# Patient Record
Sex: FEMALE | Race: BLACK OR AFRICAN AMERICAN | NOT HISPANIC OR LATINO | Employment: FULL TIME | URBAN - METROPOLITAN AREA
[De-identification: names, ages, dates, MRNs, and addresses within clinical notes are randomized per-mention and may not be internally consistent; named-entity substitution may affect disease eponyms.]

---

## 2023-04-08 LAB
ALANINE AMINOTRANSFERASE (SGPT) (U/L) IN SER/PLAS: 10 U/L (ref 7–45)
ALBUMIN (G/DL) IN SER/PLAS: 4.1 G/DL (ref 3.4–5)
ALKALINE PHOSPHATASE (U/L) IN SER/PLAS: 61 U/L (ref 33–110)
ANION GAP IN SER/PLAS: 13 MMOL/L (ref 10–20)
ASPARTATE AMINOTRANSFERASE (SGOT) (U/L) IN SER/PLAS: 16 U/L (ref 9–39)
BASOPHILS (10*3/UL) IN BLOOD BY AUTOMATED COUNT: 0.02 X10E9/L (ref 0–0.1)
BASOPHILS/100 LEUKOCYTES IN BLOOD BY AUTOMATED COUNT: 0.4 % (ref 0–2)
BILIRUBIN TOTAL (MG/DL) IN SER/PLAS: 0.4 MG/DL (ref 0–1.2)
C REACTIVE PROTEIN (MG/L) IN SER/PLAS: 0.58 MG/DL
CALCIUM (MG/DL) IN SER/PLAS: 9.6 MG/DL (ref 8.6–10.6)
CARBON DIOXIDE, TOTAL (MMOL/L) IN SER/PLAS: 24 MMOL/L (ref 21–32)
CHLORIDE (MMOL/L) IN SER/PLAS: 108 MMOL/L (ref 98–107)
CREATININE (MG/DL) IN SER/PLAS: 1.16 MG/DL (ref 0.5–1.05)
EOSINOPHILS (10*3/UL) IN BLOOD BY AUTOMATED COUNT: 0.08 X10E9/L (ref 0–0.7)
EOSINOPHILS/100 LEUKOCYTES IN BLOOD BY AUTOMATED COUNT: 1.5 % (ref 0–6)
ERYTHROCYTE DISTRIBUTION WIDTH (RATIO) BY AUTOMATED COUNT: 20.2 % (ref 11.5–14.5)
ERYTHROCYTE MEAN CORPUSCULAR HEMOGLOBIN CONCENTRATION (G/DL) BY AUTOMATED: 31 G/DL (ref 32–36)
ERYTHROCYTE MEAN CORPUSCULAR VOLUME (FL) BY AUTOMATED COUNT: 76 FL (ref 80–100)
ERYTHROCYTES (10*6/UL) IN BLOOD BY AUTOMATED COUNT: 4.38 X10E12/L (ref 4–5.2)
GFR FEMALE: 58 ML/MIN/1.73M2
GLUCOSE (MG/DL) IN SER/PLAS: 120 MG/DL (ref 74–99)
HEMATOCRIT (%) IN BLOOD BY AUTOMATED COUNT: 33.2 % (ref 36–46)
HEMOGLOBIN (G/DL) IN BLOOD: 10.3 G/DL (ref 12–16)
HYPOCHROMIA (PRESENCE) IN BLOOD BY LIGHT MICROSCOPY: NORMAL
IMMATURE GRANULOCYTES/100 LEUKOCYTES IN BLOOD BY AUTOMATED COUNT: 0.2 % (ref 0–0.9)
LEUKOCYTES (10*3/UL) IN BLOOD BY AUTOMATED COUNT: 5.2 X10E9/L (ref 4.4–11.3)
LYMPHOCYTES (10*3/UL) IN BLOOD BY AUTOMATED COUNT: 2.22 X10E9/L (ref 1.2–4.8)
LYMPHOCYTES/100 LEUKOCYTES IN BLOOD BY AUTOMATED COUNT: 42.5 % (ref 13–44)
MONOCYTES (10*3/UL) IN BLOOD BY AUTOMATED COUNT: 0.9 X10E9/L (ref 0.1–1)
MONOCYTES/100 LEUKOCYTES IN BLOOD BY AUTOMATED COUNT: 17.2 % (ref 2–10)
NEUTROPHILS (10*3/UL) IN BLOOD BY AUTOMATED COUNT: 1.99 X10E9/L (ref 1.2–7.7)
NEUTROPHILS/100 LEUKOCYTES IN BLOOD BY AUTOMATED COUNT: 38.2 % (ref 40–80)
NRBC (PER 100 WBCS) BY AUTOMATED COUNT: 0 /100 WBC (ref 0–0)
PLATELETS (10*3/UL) IN BLOOD AUTOMATED COUNT: 147 X10E9/L (ref 150–450)
POTASSIUM (MMOL/L) IN SER/PLAS: 4.9 MMOL/L (ref 3.5–5.3)
PROTEIN TOTAL: 7.3 G/DL (ref 6.4–8.2)
RBC MORPHOLOGY IN BLOOD: NORMAL
SCHISTOCYTES (PRESENCE) IN BLOOD BY LIGHT MICROSCOPY: NORMAL
SODIUM (MMOL/L) IN SER/PLAS: 140 MMOL/L (ref 136–145)
UREA NITROGEN (MG/DL) IN SER/PLAS: 11 MG/DL (ref 6–23)

## 2023-07-15 LAB
CHLAMYDIA TRACH., AMPLIFIED: NEGATIVE
N. GONORRHEA, AMPLIFIED: NEGATIVE
TRICHOMONAS VAGINALIS: NEGATIVE

## 2023-07-16 LAB
CLUE CELLS: NORMAL
NUGENT SCORE: 0
YEAST: NORMAL

## 2023-09-01 PROBLEM — D75.A G6PD DEFICIENCY: Status: ACTIVE | Noted: 2023-09-01

## 2023-09-01 PROBLEM — N89.8 VAGINAL DISCHARGE: Status: ACTIVE | Noted: 2023-09-01

## 2023-09-01 PROBLEM — G43.909 MIGRAINE HEADACHE: Status: ACTIVE | Noted: 2023-09-01

## 2023-09-01 PROBLEM — J45.909 ASTHMA (HHS-HCC): Status: ACTIVE | Noted: 2023-09-01

## 2023-09-01 PROBLEM — N76.0 BACTERIAL VAGINOSIS: Status: ACTIVE | Noted: 2023-09-01

## 2023-09-01 PROBLEM — I82.4Z2 ACUTE DEEP VEIN THROMBOSIS (DVT) OF DISTAL VEIN OF LEFT LOWER EXTREMITY (MULTI): Status: ACTIVE | Noted: 2023-09-01

## 2023-09-01 PROBLEM — T83.84XA: Status: ACTIVE | Noted: 2023-09-01

## 2023-09-01 PROBLEM — T83.32XA IUD STRINGS LOST: Status: ACTIVE | Noted: 2023-09-01

## 2023-09-01 PROBLEM — I82.5Z1 CHRONIC DEEP VEIN THROMBOSIS (DVT) OF DISTAL VEIN OF RIGHT LOWER EXTREMITY (MULTI): Status: ACTIVE | Noted: 2023-09-01

## 2023-09-01 PROBLEM — N89.8 VAGINAL IRRITATION: Status: ACTIVE | Noted: 2023-09-01

## 2023-09-01 PROBLEM — D50.9 MICROCYTIC ANEMIA: Status: ACTIVE | Noted: 2023-09-01

## 2023-09-01 PROBLEM — I10 HYPERTENSION: Status: ACTIVE | Noted: 2023-09-01

## 2023-09-01 PROBLEM — N76.0 VAGINAL INFECTION: Status: ACTIVE | Noted: 2023-09-01

## 2023-09-01 PROBLEM — Z79.899 LONG TERM USE OF DRUG: Status: ACTIVE | Noted: 2023-09-01

## 2023-09-01 PROBLEM — Z97.5 IUD CONTRACEPTION: Status: ACTIVE | Noted: 2023-09-01

## 2023-09-01 PROBLEM — B37.31 VAGINAL YEAST INFECTION: Status: ACTIVE | Noted: 2023-09-01

## 2023-09-01 PROBLEM — M05.9 RHEUMATOID ARTHRITIS WITH RHEUMATOID FACTOR (MULTI): Status: ACTIVE | Noted: 2023-09-01

## 2023-09-01 PROBLEM — M17.12 LEFT KNEE DJD: Status: ACTIVE | Noted: 2023-09-01

## 2023-09-01 PROBLEM — N76.2 VULVITIS: Status: ACTIVE | Noted: 2023-09-01

## 2023-09-01 PROBLEM — M35.00 SJOGREN'S SYNDROME (MULTI): Status: ACTIVE | Noted: 2023-09-01

## 2023-09-01 PROBLEM — M25.50 ARTHRALGIA: Status: ACTIVE | Noted: 2023-09-01

## 2023-09-01 PROBLEM — N93.9 ABNORMAL UTERINE BLEEDING: Status: ACTIVE | Noted: 2023-09-01

## 2023-09-01 PROBLEM — M54.2 CERVICALGIA: Status: ACTIVE | Noted: 2023-09-01

## 2023-09-01 PROBLEM — R55 SYNCOPE: Status: ACTIVE | Noted: 2023-09-01

## 2023-09-01 PROBLEM — B96.89 BACTERIAL VAGINOSIS: Status: ACTIVE | Noted: 2023-09-01

## 2023-09-01 RX ORDER — ZOLPIDEM TARTRATE 10 MG/1
1 TABLET ORAL NIGHTLY PRN
COMMUNITY
Start: 2020-11-09

## 2023-09-01 RX ORDER — POLYETHYLENE GLYCOL 3350, SODIUM CHLORIDE, SODIUM BICARBONATE, POTASSIUM CHLORIDE 420; 11.2; 5.72; 1.48 G/4L; G/4L; G/4L; G/4L
POWDER, FOR SOLUTION ORAL ONCE
COMMUNITY
End: 2023-10-09

## 2023-09-01 RX ORDER — WARFARIN 6 MG/1
1 TABLET ORAL DAILY
COMMUNITY

## 2023-09-01 RX ORDER — FLUTICASONE PROPIONATE 110 UG/1
1 AEROSOL, METERED RESPIRATORY (INHALATION) 2 TIMES DAILY
COMMUNITY
Start: 2020-11-09 | End: 2023-10-09

## 2023-09-01 RX ORDER — DIVALPROEX SODIUM 250 MG/1
2 TABLET, DELAYED RELEASE ORAL DAILY
COMMUNITY
Start: 2020-09-08

## 2023-09-01 RX ORDER — HYDRALAZINE HYDROCHLORIDE 100 MG/1
1 TABLET, FILM COATED ORAL 2 TIMES DAILY
COMMUNITY
Start: 2020-01-09

## 2023-09-01 RX ORDER — WARFARIN SODIUM 5 MG/1
1 TABLET ORAL DAILY
COMMUNITY
Start: 2020-08-25 | End: 2023-10-09

## 2023-09-01 RX ORDER — SIMVASTATIN 40 MG/1
1 TABLET, FILM COATED ORAL NIGHTLY
COMMUNITY
End: 2023-10-09

## 2023-09-01 RX ORDER — CLOTRIMAZOLE AND BETAMETHASONE DIPROPIONATE 10; .64 MG/G; MG/G
1 CREAM TOPICAL 3 TIMES DAILY
COMMUNITY
Start: 2018-07-26 | End: 2023-10-09

## 2023-09-01 RX ORDER — ATORVASTATIN CALCIUM 20 MG/1
0.5 TABLET, FILM COATED ORAL DAILY
COMMUNITY
End: 2023-10-09

## 2023-09-01 RX ORDER — ALBUTEROL SULFATE 90 UG/1
AEROSOL, METERED RESPIRATORY (INHALATION)
COMMUNITY
Start: 2015-04-22

## 2023-09-01 RX ORDER — FLUCONAZOLE 150 MG/1
1 TABLET ORAL
COMMUNITY
Start: 2016-12-12 | End: 2023-11-03

## 2023-09-01 RX ORDER — WARFARIN SODIUM 1 MG/1
1 TABLET ORAL DAILY
COMMUNITY
Start: 2020-11-02 | End: 2023-10-09

## 2023-09-01 RX ORDER — METFORMIN HYDROCHLORIDE 500 MG/1
1 TABLET ORAL 2 TIMES DAILY
COMMUNITY
Start: 2018-02-28

## 2023-09-01 RX ORDER — LABETALOL 200 MG/1
2 TABLET, FILM COATED ORAL 2 TIMES DAILY
COMMUNITY
Start: 2021-10-27

## 2023-09-01 RX ORDER — ZOLPIDEM TARTRATE 5 MG/1
1 TABLET ORAL NIGHTLY PRN
COMMUNITY
End: 2023-10-09

## 2023-09-01 RX ORDER — METOPROLOL SUCCINATE 200 MG/1
1 TABLET, EXTENDED RELEASE ORAL DAILY
COMMUNITY

## 2023-09-01 RX ORDER — AMLODIPINE BESYLATE 5 MG/1
1 TABLET ORAL DAILY
COMMUNITY
Start: 2021-10-27 | End: 2023-11-22

## 2023-09-01 RX ORDER — LEFLUNOMIDE 20 MG/1
1 TABLET ORAL DAILY
COMMUNITY
Start: 2014-09-03 | End: 2024-03-27 | Stop reason: SDUPTHER

## 2023-09-01 RX ORDER — METAXALONE 800 MG/1
1 TABLET ORAL 3 TIMES DAILY
COMMUNITY
Start: 2019-07-12 | End: 2023-10-09

## 2023-09-01 RX ORDER — ETONOGESTREL 68 MG/1
IMPLANT SUBCUTANEOUS
COMMUNITY

## 2023-09-01 RX ORDER — ACETAMINOPHEN 500 MG
1 TABLET ORAL DAILY
COMMUNITY
Start: 2015-08-21 | End: 2023-10-09

## 2023-09-01 RX ORDER — GABAPENTIN 300 MG/1
1 CAPSULE ORAL NIGHTLY
COMMUNITY
Start: 2014-08-27 | End: 2024-03-27 | Stop reason: SDUPTHER

## 2023-09-01 RX ORDER — BLOOD SUGAR DIAGNOSTIC
STRIP MISCELLANEOUS
COMMUNITY
Start: 2021-10-24

## 2023-09-15 LAB
INR IN PPP BY COAGULATION ASSAY EXTERNAL: 2.5
PROTHROMBIN TIME (PT) IN PPP BY COAGULATION ASSAY EXTERNAL: NORMAL SECONDS

## 2023-10-06 ENCOUNTER — LAB (OUTPATIENT)
Dept: LAB | Facility: LAB | Age: 48
End: 2023-10-06
Payer: COMMERCIAL

## 2023-10-06 ENCOUNTER — ANTICOAGULATION - WARFARIN VISIT (OUTPATIENT)
Dept: CARDIOLOGY | Facility: CLINIC | Age: 48
End: 2023-10-06
Payer: COMMERCIAL

## 2023-10-06 ENCOUNTER — TELEPHONE (OUTPATIENT)
Dept: RHEUMATOLOGY | Facility: CLINIC | Age: 48
End: 2023-10-06

## 2023-10-06 DIAGNOSIS — M05.9 RHEUMATOID ARTHRITIS WITH RHEUMATOID FACTOR, UNSPECIFIED (MULTI): ICD-10-CM

## 2023-10-06 DIAGNOSIS — M05.9 RHEUMATOID ARTHRITIS WITH RHEUMATOID FACTOR, UNSPECIFIED (MULTI): Primary | ICD-10-CM

## 2023-10-06 DIAGNOSIS — M25.562 ARTHRALGIA OF LEFT KNEE: Primary | ICD-10-CM

## 2023-10-06 LAB
ACANTHOCYTES BLD QL SMEAR: NORMAL
ALBUMIN SERPL BCP-MCNC: 4.6 G/DL (ref 3.4–5)
ALP SERPL-CCNC: 61 U/L (ref 33–110)
ALT SERPL W P-5'-P-CCNC: 15 U/L (ref 7–45)
ANION GAP SERPL CALC-SCNC: 15 MMOL/L (ref 10–20)
AST SERPL W P-5'-P-CCNC: 19 U/L (ref 9–39)
BASOPHILS # BLD MANUAL: 0.08 X10*3/UL (ref 0–0.1)
BASOPHILS NFR BLD MANUAL: 1.7 %
BILIRUB SERPL-MCNC: 0.5 MG/DL (ref 0–1.2)
BUN SERPL-MCNC: 12 MG/DL (ref 6–23)
CALCIUM SERPL-MCNC: 9.6 MG/DL (ref 8.6–10.6)
CHLORIDE SERPL-SCNC: 108 MMOL/L (ref 98–107)
CO2 SERPL-SCNC: 23 MMOL/L (ref 21–32)
CREAT SERPL-MCNC: 1.17 MG/DL (ref 0.5–1.05)
CRP SERPL-MCNC: 0.13 MG/DL
EOSINOPHIL # BLD MANUAL: 0.21 X10*3/UL (ref 0–0.7)
EOSINOPHIL NFR BLD MANUAL: 4.4 %
ERYTHROCYTE [DISTWIDTH] IN BLOOD BY AUTOMATED COUNT: 21.7 % (ref 11.5–14.5)
GFR SERPL CREATININE-BSD FRML MDRD: 57 ML/MIN/1.73M*2
GLUCOSE SERPL-MCNC: 88 MG/DL (ref 74–99)
HCT VFR BLD AUTO: 34.4 % (ref 36–46)
HGB BLD-MCNC: 10.5 G/DL (ref 12–16)
HYPOCHROMIA BLD QL SMEAR: NORMAL
IMM GRANULOCYTES # BLD AUTO: 0.01 X10*3/UL (ref 0–0.7)
IMM GRANULOCYTES NFR BLD AUTO: 0.2 % (ref 0–0.9)
LYMPHOCYTES # BLD MANUAL: 2.43 X10*3/UL (ref 1.2–4.8)
LYMPHOCYTES NFR BLD MANUAL: 51.8 %
MCH RBC QN AUTO: 23 PG (ref 26–34)
MCHC RBC AUTO-ENTMCNC: 30.5 G/DL (ref 32–36)
MCV RBC AUTO: 75 FL (ref 80–100)
MONOCYTES # BLD MANUAL: 0.58 X10*3/UL (ref 0.1–1)
MONOCYTES NFR BLD MANUAL: 12.3 %
NEUTS SEG # BLD MANUAL: 1.4 X10*3/UL (ref 1.2–7)
NEUTS SEG NFR BLD MANUAL: 29.8 %
NRBC BLD-RTO: 0 /100 WBCS (ref 0–0)
OVALOCYTES BLD QL SMEAR: NORMAL
PLATELET # BLD AUTO: 135 X10*3/UL (ref 150–450)
PMV BLD AUTO: ABNORMAL FL
POC INR: 3.4
POC PROTHROMBIN TIME: NORMAL
POLYCHROMASIA BLD QL SMEAR: NORMAL
POTASSIUM SERPL-SCNC: 4.6 MMOL/L (ref 3.5–5.3)
PROT SERPL-MCNC: 7.8 G/DL (ref 6.4–8.2)
RBC # BLD AUTO: 4.57 X10*6/UL (ref 4–5.2)
RBC MORPH BLD: NORMAL
SCHISTOCYTES BLD QL SMEAR: NORMAL
SODIUM SERPL-SCNC: 141 MMOL/L (ref 136–145)
TOTAL CELLS COUNTED BLD: 114
WBC # BLD AUTO: 4.7 X10*3/UL (ref 4.4–11.3)

## 2023-10-06 PROCEDURE — 85610 PROTHROMBIN TIME: CPT

## 2023-10-06 PROCEDURE — 36415 COLL VENOUS BLD VENIPUNCTURE: CPT

## 2023-10-06 PROCEDURE — 85060 BLOOD SMEAR INTERPRETATION: CPT | Performed by: INTERNAL MEDICINE

## 2023-10-06 PROCEDURE — 85610 PROTHROMBIN TIME: CPT | Mod: QW | Performed by: INTERNAL MEDICINE

## 2023-10-06 PROCEDURE — 86140 C-REACTIVE PROTEIN: CPT

## 2023-10-06 PROCEDURE — 99211 OFF/OP EST MAY X REQ PHY/QHP: CPT | Mod: 25 | Performed by: INTERNAL MEDICINE

## 2023-10-06 PROCEDURE — 85027 COMPLETE CBC AUTOMATED: CPT

## 2023-10-06 PROCEDURE — 80053 COMPREHEN METABOLIC PANEL: CPT

## 2023-10-06 PROCEDURE — 85007 BL SMEAR W/DIFF WBC COUNT: CPT

## 2023-10-06 NOTE — TELEPHONE ENCOUNTER
Patient said Dr. Pepe prescribed a knee x-ray and regular blood work for testing. She went to the lab and the radiology department and neither has record of the orders being placed. Please advise.

## 2023-10-07 ENCOUNTER — APPOINTMENT (OUTPATIENT)
Dept: CARDIOLOGY | Facility: CLINIC | Age: 48
End: 2023-10-07
Payer: COMMERCIAL

## 2023-10-09 ENCOUNTER — ANCILLARY PROCEDURE (OUTPATIENT)
Dept: RADIOLOGY | Facility: CLINIC | Age: 48
End: 2023-10-09
Payer: COMMERCIAL

## 2023-10-09 ENCOUNTER — OFFICE VISIT (OUTPATIENT)
Dept: RHEUMATOLOGY | Facility: CLINIC | Age: 48
End: 2023-10-09
Payer: COMMERCIAL

## 2023-10-09 ENCOUNTER — HOSPITAL ENCOUNTER (OUTPATIENT)
Dept: RADIOLOGY | Facility: HOSPITAL | Age: 48
Discharge: HOME | End: 2023-10-09
Payer: COMMERCIAL

## 2023-10-09 VITALS
SYSTOLIC BLOOD PRESSURE: 130 MMHG | TEMPERATURE: 97.6 F | BODY MASS INDEX: 30.21 KG/M2 | DIASTOLIC BLOOD PRESSURE: 83 MMHG | WEIGHT: 176 LBS | HEART RATE: 95 BPM

## 2023-10-09 DIAGNOSIS — M17.12 PRIMARY OSTEOARTHRITIS OF LEFT KNEE: ICD-10-CM

## 2023-10-09 DIAGNOSIS — M05.9 RHEUMATOID ARTHRITIS WITH POSITIVE RHEUMATOID FACTOR, INVOLVING UNSPECIFIED SITE (MULTI): ICD-10-CM

## 2023-10-09 DIAGNOSIS — M17.12 PRIMARY OSTEOARTHRITIS OF LEFT KNEE: Primary | ICD-10-CM

## 2023-10-09 DIAGNOSIS — M25.50 ARTHRALGIA, UNSPECIFIED JOINT: Primary | ICD-10-CM

## 2023-10-09 LAB — PATH REVIEW-CBC DIFFERENTIAL: NORMAL

## 2023-10-09 PROCEDURE — 72110 X-RAY EXAM L-2 SPINE 4/>VWS: CPT | Performed by: STUDENT IN AN ORGANIZED HEALTH CARE EDUCATION/TRAINING PROGRAM

## 2023-10-09 PROCEDURE — 72170 X-RAY EXAM OF PELVIS: CPT

## 2023-10-09 PROCEDURE — 99213 OFFICE O/P EST LOW 20 MIN: CPT | Performed by: INTERNAL MEDICINE

## 2023-10-09 PROCEDURE — 3075F SYST BP GE 130 - 139MM HG: CPT | Performed by: INTERNAL MEDICINE

## 2023-10-09 PROCEDURE — 3079F DIAST BP 80-89 MM HG: CPT | Performed by: INTERNAL MEDICINE

## 2023-10-09 PROCEDURE — 72110 X-RAY EXAM L-2 SPINE 4/>VWS: CPT

## 2023-10-09 PROCEDURE — 72170 X-RAY EXAM OF PELVIS: CPT | Performed by: STUDENT IN AN ORGANIZED HEALTH CARE EDUCATION/TRAINING PROGRAM

## 2023-10-09 RX ORDER — ATORVASTATIN CALCIUM 40 MG/1
40 TABLET, FILM COATED ORAL DAILY
COMMUNITY
Start: 2023-03-20

## 2023-10-09 ASSESSMENT — PAIN SCALES - GENERAL: PAINLEVEL: 5

## 2023-10-09 NOTE — PROGRESS NOTES
Chief Complaint   Patient presents with    Hip Pain   She complains of pain in the lower back and left hip.  She has difficulty lying on her left side because of the pain in the left hip.  She notes pain in the lower back for which she was prescribed tramadol.  She noted some improvement in symptoms with taking the tramadol.  The metaxalone was never started because was not approved by her health insurance plan.  She continues to take gabapentin 300 mg at bedtime.  She notes that the gabapentin causes sedation.  She continues to take leflunomide 20 mg daily.    Physical Exam she is a well-developed, well-nourished, black female.  The lungs, heart, abdomen, and extremities are benign.  The musculoskeletal examination shows slight curvature of the thoracolumbar spine.  There is tenderness of the lower back bilaterally and along the posterior lateral aspect of the left hand.  There is pain in the left hip and groin with internal and external rotation of the left hip.  There is preserved range of motion of the upper and lower extremity joints.  There are no joint effusions.     Radiology: Renal vascular ultrasound (10/14/2022) no significant stenosis.  Renal veins are patent bilaterally.    Lab Results   Component Value Date    WBC 4.7 10/06/2023    HGB 10.5 (L) 10/06/2023    HCT 34.4 (L) 10/06/2023    MCV 75 (L) 10/06/2023     (L) 10/06/2023     Lab Results   Component Value Date    GLUCOSE 88 10/06/2023    CALCIUM 9.6 10/06/2023     10/06/2023    K 4.6 10/06/2023    CO2 23 10/06/2023     (H) 10/06/2023    BUN 12 10/06/2023    CREATININE 1.17 (H) 10/06/2023     Lab Results   Component Value Date    SEDRATE 21 (H) 05/08/2021     Lab Results   Component Value Date    CRP 0.13 10/06/2023   Alkaline phosphatase 61, AST 19, ALT 15, calcium 9.6, albumin 4.6.    She has rheumatoid arthritis and Sjogren syndrome.  She has pain in the left hip and lower back with tenderness overlying the left greater  trochanteric bursa consistent with left greater trochanteric bursitis.  She has microcytic anemia and mild thrombocytopenia likely related to leflunomide.  She has mild renal insufficiency.  She is on warfarin therapy status post left lower extremity deep vein thrombosis, polycystic ovarian syndrome, left carpal tunnel syndrome, hypertension.  Problem List Items Addressed This Visit    None  She is to increase gabapentin to 300 mg twice daily.  She is to reduce leflunomide to 20 mg daily 6 days/week.  She is to have laboratory for follow-up of leflunomide therapy.  She is to have radiographs of the lumbar spine, hips, and knees.  She is to return at the next available office appointment.

## 2023-10-21 ENCOUNTER — ANTICOAGULATION - WARFARIN VISIT (OUTPATIENT)
Dept: CARDIOLOGY | Facility: CLINIC | Age: 48
End: 2023-10-21
Payer: COMMERCIAL

## 2023-10-21 DIAGNOSIS — I82.4Z2 ACUTE DEEP VEIN THROMBOSIS (DVT) OF DISTAL VEIN OF LEFT LOWER EXTREMITY (MULTI): ICD-10-CM

## 2023-10-21 LAB
POC INR: 2.5
POC PROTHROMBIN TIME: NORMAL

## 2023-10-21 PROCEDURE — 99211 OFF/OP EST MAY X REQ PHY/QHP: CPT | Performed by: INTERNAL MEDICINE

## 2023-10-21 PROCEDURE — 85610 PROTHROMBIN TIME: CPT | Mod: QW | Performed by: INTERNAL MEDICINE

## 2023-11-03 ENCOUNTER — LAB (OUTPATIENT)
Dept: LAB | Facility: LAB | Age: 48
End: 2023-11-03
Payer: COMMERCIAL

## 2023-11-03 ENCOUNTER — OFFICE VISIT (OUTPATIENT)
Dept: OBSTETRICS AND GYNECOLOGY | Facility: CLINIC | Age: 48
End: 2023-11-03
Payer: COMMERCIAL

## 2023-11-03 VITALS — DIASTOLIC BLOOD PRESSURE: 117 MMHG | WEIGHT: 175 LBS | SYSTOLIC BLOOD PRESSURE: 192 MMHG | BODY MASS INDEX: 30.04 KG/M2

## 2023-11-03 DIAGNOSIS — M25.50 ARTHRALGIA, UNSPECIFIED JOINT: ICD-10-CM

## 2023-11-03 DIAGNOSIS — N90.89 VULVAR LESION: ICD-10-CM

## 2023-11-03 DIAGNOSIS — M05.9 RHEUMATOID ARTHRITIS WITH POSITIVE RHEUMATOID FACTOR, INVOLVING UNSPECIFIED SITE (MULTI): ICD-10-CM

## 2023-11-03 DIAGNOSIS — Z11.3 SCREEN FOR STD (SEXUALLY TRANSMITTED DISEASE): ICD-10-CM

## 2023-11-03 DIAGNOSIS — N76.0 ACUTE VAGINITIS: ICD-10-CM

## 2023-11-03 DIAGNOSIS — Z11.3 SCREEN FOR STD (SEXUALLY TRANSMITTED DISEASE): Primary | ICD-10-CM

## 2023-11-03 LAB
ALBUMIN SERPL BCP-MCNC: 4.5 G/DL (ref 3.4–5)
ALP SERPL-CCNC: 54 U/L (ref 33–110)
ALT SERPL W P-5'-P-CCNC: 15 U/L (ref 7–45)
ANION GAP SERPL CALC-SCNC: 13 MMOL/L (ref 10–20)
AST SERPL W P-5'-P-CCNC: 18 U/L (ref 9–39)
BASOPHILS # BLD MANUAL: 0.08 X10*3/UL (ref 0–0.1)
BASOPHILS NFR BLD MANUAL: 1.8 %
BILIRUB SERPL-MCNC: 0.5 MG/DL (ref 0–1.2)
BUN SERPL-MCNC: 11 MG/DL (ref 6–23)
CALCIUM SERPL-MCNC: 9 MG/DL (ref 8.6–10.6)
CHLORIDE SERPL-SCNC: 109 MMOL/L (ref 98–107)
CO2 SERPL-SCNC: 23 MMOL/L (ref 21–32)
CREAT SERPL-MCNC: 0.93 MG/DL (ref 0.5–1.05)
CRP SERPL-MCNC: 0.14 MG/DL
EOSINOPHIL # BLD MANUAL: 0.12 X10*3/UL (ref 0–0.7)
EOSINOPHIL NFR BLD MANUAL: 2.6 %
ERYTHROCYTE [DISTWIDTH] IN BLOOD BY AUTOMATED COUNT: 21.1 % (ref 11.5–14.5)
GFR SERPL CREATININE-BSD FRML MDRD: 76 ML/MIN/1.73M*2
GLUCOSE SERPL-MCNC: 85 MG/DL (ref 74–99)
HCT VFR BLD AUTO: 34.8 % (ref 36–46)
HCV AB SER QL: NONREACTIVE
HGB BLD-MCNC: 10.5 G/DL (ref 12–16)
HIV 1+2 AB+HIV1 P24 AG SERPL QL IA: NONREACTIVE
HYPOCHROMIA BLD QL SMEAR: NORMAL
IMM GRANULOCYTES # BLD AUTO: 0.01 X10*3/UL (ref 0–0.7)
IMM GRANULOCYTES NFR BLD AUTO: 0.2 % (ref 0–0.9)
LYMPHOCYTES # BLD MANUAL: 2.64 X10*3/UL (ref 1.2–4.8)
LYMPHOCYTES NFR BLD MANUAL: 56.1 %
MCH RBC QN AUTO: 22.7 PG (ref 26–34)
MCHC RBC AUTO-ENTMCNC: 30.2 G/DL (ref 32–36)
MCV RBC AUTO: 75 FL (ref 80–100)
MONOCYTES # BLD MANUAL: 0.25 X10*3/UL (ref 0.1–1)
MONOCYTES NFR BLD MANUAL: 5.3 %
NEUTS SEG # BLD MANUAL: 1.4 X10*3/UL (ref 1.2–7)
NEUTS SEG NFR BLD MANUAL: 29.8 %
NRBC BLD-RTO: 0 /100 WBCS (ref 0–0)
OVALOCYTES BLD QL SMEAR: NORMAL
PLATELET # BLD AUTO: 148 X10*3/UL (ref 150–450)
POTASSIUM SERPL-SCNC: 4.3 MMOL/L (ref 3.5–5.3)
PROT SERPL-MCNC: 7.5 G/DL (ref 6.4–8.2)
RBC # BLD AUTO: 4.63 X10*6/UL (ref 4–5.2)
RBC MORPH BLD: NORMAL
SCHISTOCYTES BLD QL SMEAR: NORMAL
SODIUM SERPL-SCNC: 141 MMOL/L (ref 136–145)
TOTAL CELLS COUNTED BLD: 114
VARIANT LYMPHS # BLD MANUAL: 0.21 X10*3/UL (ref 0–0.5)
VARIANT LYMPHS NFR BLD: 4.4 %
WBC # BLD AUTO: 4.7 X10*3/UL (ref 4.4–11.3)

## 2023-11-03 PROCEDURE — 80053 COMPREHEN METABOLIC PANEL: CPT

## 2023-11-03 PROCEDURE — 36415 COLL VENOUS BLD VENIPUNCTURE: CPT

## 2023-11-03 PROCEDURE — 85007 BL SMEAR W/DIFF WBC COUNT: CPT

## 2023-11-03 PROCEDURE — 1036F TOBACCO NON-USER: CPT | Performed by: OBSTETRICS & GYNECOLOGY

## 2023-11-03 PROCEDURE — 86780 TREPONEMA PALLIDUM: CPT

## 2023-11-03 PROCEDURE — 99214 OFFICE O/P EST MOD 30 MIN: CPT | Performed by: OBSTETRICS & GYNECOLOGY

## 2023-11-03 PROCEDURE — 87661 TRICHOMONAS VAGINALIS AMPLIF: CPT

## 2023-11-03 PROCEDURE — 86803 HEPATITIS C AB TEST: CPT

## 2023-11-03 PROCEDURE — 86695 HERPES SIMPLEX TYPE 1 TEST: CPT

## 2023-11-03 PROCEDURE — 86696 HERPES SIMPLEX TYPE 2 TEST: CPT

## 2023-11-03 PROCEDURE — 87205 SMEAR GRAM STAIN: CPT

## 2023-11-03 PROCEDURE — 3077F SYST BP >= 140 MM HG: CPT | Performed by: OBSTETRICS & GYNECOLOGY

## 2023-11-03 PROCEDURE — 87800 DETECT AGNT MULT DNA DIREC: CPT

## 2023-11-03 PROCEDURE — 3080F DIAST BP >= 90 MM HG: CPT | Performed by: OBSTETRICS & GYNECOLOGY

## 2023-11-03 PROCEDURE — 85027 COMPLETE CBC AUTOMATED: CPT

## 2023-11-03 PROCEDURE — 86140 C-REACTIVE PROTEIN: CPT

## 2023-11-03 PROCEDURE — 87389 HIV-1 AG W/HIV-1&-2 AB AG IA: CPT

## 2023-11-03 NOTE — PROGRESS NOTES
"Subjective   Julianne Baxter is a 48 y.o. female who complains of vaginal discharge/itch.    HPI:  Symptoms reported: vaginal discharge and a lesion that was on the left lower vulva that was similar to \"peeling skin\".  It was superficial and not ulcerative.  It was uncomfortable with urination.  She feels it could be associated with intercourse and \"an abrasion\" . She is tearful discussing this as she feels it could be a sign of herpes.  She has the same partner for 4 years.  There are no concerns that he has other partners.  She denies a significant discharge.  No fevers or chills.  The lesion has resolved.  Onset:  2 weeks ago.  Course:  resolved  Progression: resolved    Helpful treatments: none  Unhelpful treatments tried: none    Objective   Physical Exam:   General Appearance: alert and oriented, in no acute distress  Abdomen: soft, non-tender; bowel sounds normal; no masses, no organomegaly  Pelvic Exam: vagina normal without discharge, uterus normal size, shape, and consistency, no cervical motion tenderness, cervix normal in appearance, and no adnexal masses or tenderness.  On exam of the external genitalia the left  lower vulva is well-healed.  There is a slight hypopigmented area consistent with prior lesion.  Urine dipstick: not done.    Assessment/Plan   Diagnoses and all orders for this visit:  Screen for STD (sexually transmitted disease)  -     HIV 1/2 Antigen/Antibody Screen with Reflex to Confirmation; Future  -     Hepatitis C Antibody; Future  -     Syphilis Screen with Reflex; Future  -     C. Trachomatis / N. Gonorrhoeae, Amplified Detection  -     TRICH VAGINALIS, AMPLIFIED  -     Vaginitis Gram Stain For Bacterial Vaginosis + Yeast  -     HSV1 IgG and HSV2 IgG; Future  Vulvar lesion  -     HSV1 IgG and HSV2 IgG; Future  Acute vaginitis  -     Vaginitis Gram Stain For Bacterial Vaginosis + Yeast; Future  This is a 48-year-old female that had a left lower vulvar lesion that has resolved.  He has " a lesion may have appeared during intercourse and was superficial could likely be an abrasion.  I did order herpes antibodies, included HIV, hepatitis C and syphilis testing.  Cultures were sent for BV, yeast, chlamydia, gonorrhea and trichomonas.  I recommend protecting the skin with Aquaphor.  I will contact her with abnormal results.  Reassurance was given.

## 2023-11-04 LAB
C TRACH RRNA SPEC QL NAA+PROBE: NEGATIVE
CLUE CELLS VAG LPF-#/AREA: NORMAL /[LPF]
HERPES SIMPLEX VIRUS 1 IGG: >8 INDEX
HERPES SIMPLEX VIRUS 2 IGG: >8 INDEX
N GONORRHOEA DNA SPEC QL PROBE+SIG AMP: NEGATIVE
NUGENT SCORE: NORMAL
T PALLIDUM AB SER QL: NONREACTIVE
T VAGINALIS RRNA SPEC QL NAA+PROBE: NEGATIVE
YEAST VAG WET PREP-#/AREA: NORMAL

## 2023-11-04 NOTE — RESULT ENCOUNTER NOTE
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  We discussed the HSV type II tends to be seen in the genital area.  The lesion was healed on your exam, so we cannot be definitive if that is what it was.  If there is any tingling or resumption of a blister type lesion, then let me know.  We can manage it with a medication called Valtrex.

## 2023-11-18 ENCOUNTER — ANTICOAGULATION - WARFARIN VISIT (OUTPATIENT)
Dept: CARDIOLOGY | Facility: CLINIC | Age: 48
End: 2023-11-18
Payer: COMMERCIAL

## 2023-11-18 DIAGNOSIS — I10 PRIMARY HYPERTENSION: Primary | ICD-10-CM

## 2023-11-18 DIAGNOSIS — I82.5Z1 CHRONIC DEEP VEIN THROMBOSIS (DVT) OF DISTAL VEIN OF RIGHT LOWER EXTREMITY (MULTI): ICD-10-CM

## 2023-11-18 DIAGNOSIS — I82.4Z2 ACUTE DEEP VEIN THROMBOSIS (DVT) OF DISTAL VEIN OF LEFT LOWER EXTREMITY (MULTI): Primary | ICD-10-CM

## 2023-11-18 LAB
POC INR: 2.3
POC PROTHROMBIN TIME: NORMAL

## 2023-11-18 PROCEDURE — 99211 OFF/OP EST MAY X REQ PHY/QHP: CPT | Performed by: INTERNAL MEDICINE

## 2023-11-18 PROCEDURE — 85610 PROTHROMBIN TIME: CPT | Mod: QW | Performed by: INTERNAL MEDICINE

## 2023-11-18 NOTE — PROGRESS NOTES
Patient identification verified with 2 identifiers.    Location: [facility] - [address] [address] [phone] option #1     Referring Physician: LALY  Enrollment/ Re-enrollment date: 24   INR Goal: 2.0-3.0  INR monitoring is per [facility] protocol.  Anticoagulation Medication: warfarin  Indication: DVT    Subjective   Bleeding signs/symptoms:      Bruising:     Major bleeding event:    Thrombosis signs/symptoms:    Thromboembolic event:    Missed doses:    Extra doses:    Medication changes:    Dietary changes:    Change in health:    Change in activity:    Alcohol:    Other concerns:      Upcoming Surgeries:  Does the Patient Have any upcoming surgeries that require interruption in anticoagulation therapy? no  Does the patient require bridging? no      Anticoagulation Summary  As of 2023      INR goal:  2.0-3.0   TTR:  70.2 % (1.8 mo)   INR used for dosin.30 (2023)   Weekly warfarin total:  52.5 mg               Assessment/Plan   Therapeutic     1. New dose: no change    2. Next INR: 1 month      Education provided to patient during the visit:  Patient instructed to call in interim with questions, concerns and changes.

## 2023-11-22 RX ORDER — AMLODIPINE BESYLATE 5 MG/1
5 TABLET ORAL 2 TIMES DAILY
Qty: 180 TABLET | Refills: 0 | Status: SHIPPED | OUTPATIENT
Start: 2023-11-22

## 2023-12-16 ENCOUNTER — ANTICOAGULATION - WARFARIN VISIT (OUTPATIENT)
Dept: CARDIOLOGY | Facility: CLINIC | Age: 48
End: 2023-12-16
Payer: COMMERCIAL

## 2023-12-16 DIAGNOSIS — I82.4Z2 ACUTE DEEP VEIN THROMBOSIS (DVT) OF DISTAL VEIN OF LEFT LOWER EXTREMITY (MULTI): Primary | ICD-10-CM

## 2023-12-16 LAB
POC INR: 2.6
POC PROTHROMBIN TIME: NORMAL

## 2023-12-16 PROCEDURE — 99211 OFF/OP EST MAY X REQ PHY/QHP: CPT | Mod: 27 | Performed by: INTERNAL MEDICINE

## 2023-12-16 PROCEDURE — 85610 PROTHROMBIN TIME: CPT | Mod: QW

## 2023-12-16 NOTE — PROGRESS NOTES
Patient identification verified with 2 identifiers.    Location: [facility] [address] [address] [phone] option #1     Referring Physician: LALY  Enrollment/ Re-enrollment date: 24   INR Goal: 2.0-3.0  INR monitoring is per [facility] protocol.  Anticoagulation Medication: warfarin  Indication: DVT    Subjective   Bleeding signs/symptoms: No    Bruising: No   Major bleeding event: No  Thrombosis signs/symptoms: No  Thromboembolic event: No  Missed doses: No  Extra doses: No  Medication changes: No  Dietary changes: No  Change in health: No  Change in activity: No  Alcohol: No  Other concerns: No    Upcoming Surgeries:  Does the Patient Have any upcoming surgeries that require interruption in anticoagulation therapy? no  Does the patient require bridging? no      Anticoagulation Summary  As of 2023      INR goal:  2.0-3.0   TTR:  80.3 % (2.7 mo)   INR used for dosin.60 (2023)   Weekly warfarin total:  52.5 mg               Assessment/Plan   Therapeutic     1. New dose: no change    2. Next INR: 1 month      Education provided to patient during the visit:  Patient instructed to call in interim with questions, concerns and changes.

## 2024-01-13 ENCOUNTER — ANTICOAGULATION - WARFARIN VISIT (OUTPATIENT)
Dept: CARDIOLOGY | Facility: CLINIC | Age: 49
End: 2024-01-13
Payer: COMMERCIAL

## 2024-01-13 DIAGNOSIS — I82.4Z2 ACUTE DEEP VEIN THROMBOSIS (DVT) OF DISTAL VEIN OF LEFT LOWER EXTREMITY (MULTI): Primary | ICD-10-CM

## 2024-01-13 LAB
POC INR: 3.2
POC PROTHROMBIN TIME: NORMAL

## 2024-01-13 PROCEDURE — 99211 OFF/OP EST MAY X REQ PHY/QHP: CPT | Performed by: INTERNAL MEDICINE

## 2024-01-13 PROCEDURE — 85610 PROTHROMBIN TIME: CPT | Mod: QW

## 2024-01-13 NOTE — PROGRESS NOTES
Patient identification verified with 2 identifiers.    Location: [facility] at [facility] - [address] [address] [phone] option #1     Referring Physician: LALY  Enrollment/ Re-enrollment date: 7/26/24   INR Goal: 2.0-3.0  INR monitoring is per [facility] protocol.  Anticoagulation Medication: warfarin  Indication: DVT    Subjective   Bleeding signs/symptoms: No    Bruising: No   Major bleeding event: No  Thrombosis signs/symptoms: No  Thromboembolic event: No  Missed doses: No  Extra doses: No  Medication changes: Yes  STARTED AMBIEN  Dietary changes: No  Change in health: No  Change in activity: No  Alcohol: No  Other concerns: No    Upcoming Surgeries:  Does the Patient Have any upcoming surgeries that require interruption in anticoagulation therapy? no  Does the patient require bridging? no      Anticoagulation Summary  As of 1/13/2024      INR goal:  2.0-3.0   TTR:  76.8 % (3.7 mo)   INR used for dosing:  3.20 (1/13/2024)   Weekly warfarin total:  52.5 mg               Assessment/Plan   Supra therapeutic in setting of cranberry consumption    1. New dose: no change has been monthly on this dose  2. Next INR: 2 weeks as patient needs a Saturday appointment      Education provided to patient during the visit:  Patient instructed to call in interim with questions, concerns and changes.

## 2024-01-27 ENCOUNTER — ANTICOAGULATION - WARFARIN VISIT (OUTPATIENT)
Dept: CARDIOLOGY | Facility: CLINIC | Age: 49
End: 2024-01-27
Payer: COMMERCIAL

## 2024-01-27 DIAGNOSIS — I82.4Z2 ACUTE DEEP VEIN THROMBOSIS (DVT) OF DISTAL VEIN OF LEFT LOWER EXTREMITY (MULTI): Primary | ICD-10-CM

## 2024-01-27 LAB
POC INR: 3.3
POC PROTHROMBIN TIME: NORMAL

## 2024-01-27 PROCEDURE — 85610 PROTHROMBIN TIME: CPT | Mod: QW

## 2024-01-27 PROCEDURE — 99211 OFF/OP EST MAY X REQ PHY/QHP: CPT | Performed by: INTERNAL MEDICINE

## 2024-01-27 NOTE — PROGRESS NOTES
Patient identification verified with 2 identifiers.    Location: [facility] at [facility] - [address] [address] [phone] option #1     Referring Physician: LALY  Enrollment/ Re-enrollment date: 7/26/24   INR Goal: 2.0-3.0  INR monitoring is per [facility] protocol.  Anticoagulation Medication: warfarin  Indication: DVT    Subjective   Bleeding signs/symptoms: No    Bruising: No   Major bleeding event: No  Thrombosis signs/symptoms: No  Thromboembolic event: No  Missed doses: No  Extra doses: No  Medication changes: No  Dietary changes: No  Change in health: No  Change in activity: No  Alcohol: No  Other concerns: No    Upcoming Surgeries:  Does the Patient Have any upcoming surgeries that require interruption in anticoagulation therapy? no  Does the patient require bridging? no      Anticoagulation Summary  As of 1/27/2024      INR goal:  2.0-3.0   TTR:  68.4 % (4.1 mo)   INR used for dosing:  3.30 (1/27/2024)   Weekly warfarin total:  50 mg               Assessment/Plan   Supra therapeutic     1. New dose: no change has been monthly on this dose  2. Next INR: 2 weeks as patient needs a Saturday appointment      Education provided to patient during the visit:  Patient instructed to call in interim with questions, concerns and changes.

## 2024-02-10 ENCOUNTER — ANTICOAGULATION - WARFARIN VISIT (OUTPATIENT)
Dept: CARDIOLOGY | Facility: CLINIC | Age: 49
End: 2024-02-10
Payer: COMMERCIAL

## 2024-02-10 DIAGNOSIS — I82.4Z2 ACUTE DEEP VEIN THROMBOSIS (DVT) OF DISTAL VEIN OF LEFT LOWER EXTREMITY (MULTI): Primary | ICD-10-CM

## 2024-02-10 LAB
POC INR: 2.5
POC PROTHROMBIN TIME: NORMAL

## 2024-02-10 PROCEDURE — 85610 PROTHROMBIN TIME: CPT | Mod: QW

## 2024-02-10 PROCEDURE — 99211 OFF/OP EST MAY X REQ PHY/QHP: CPT | Performed by: INTERNAL MEDICINE

## 2024-02-10 NOTE — PROGRESS NOTES
Patient identification verified with 2 identifiers.    Location: [facility] [address] [address] [phone] option #1     Referring Physician: LALY  Enrollment/ Re-enrollment date: 24   INR Goal: 2.0-3.0  INR monitoring is per [facility] protocol.  Anticoagulation Medication: warfarin  Indication: DVT    Subjective   Bleeding signs/symptoms: No    Bruising: No   Major bleeding event: No  Thrombosis signs/symptoms: No  Thromboembolic event: No  Missed doses: No  Extra doses: No  Medication changes: No  Dietary changes: No  Change in health: No  Change in activity: No  Alcohol: No  Other concerns: No    Upcoming Surgeries:  Does the Patient Have any upcoming surgeries that require interruption in anticoagulation therapy? no  Does the patient require bridging? no      Anticoagulation Summary  As of 2/10/2024      INR goal:  2.0-3.0   TTR:  67.8 % (4.6 mo)   INR used for dosin.50 (2/10/2024)   Weekly warfarin total:  50 mg               Assessment/Plan    therapeutic     1. New dose: no change   2. Next INR: 4 weeks as patient needs a Saturday appointment      Education provided to patient during the visit:  Patient instructed to call in interim with questions, concerns and changes.

## 2024-02-26 ENCOUNTER — HOSPITAL ENCOUNTER (OUTPATIENT)
Dept: RADIOLOGY | Facility: HOSPITAL | Age: 49
Discharge: HOME | End: 2024-02-26
Payer: COMMERCIAL

## 2024-02-26 DIAGNOSIS — M17.12 PRIMARY OSTEOARTHRITIS OF LEFT KNEE: ICD-10-CM

## 2024-02-26 PROCEDURE — 73560 X-RAY EXAM OF KNEE 1 OR 2: CPT | Mod: 50

## 2024-02-27 ENCOUNTER — HOSPITAL ENCOUNTER (OUTPATIENT)
Dept: RADIOLOGY | Facility: CLINIC | Age: 49
Discharge: HOME | End: 2024-02-27
Payer: COMMERCIAL

## 2024-02-27 VITALS — HEIGHT: 64 IN | WEIGHT: 175.04 LBS | BODY MASS INDEX: 29.88 KG/M2

## 2024-02-27 DIAGNOSIS — Z12.31 ENCOUNTER FOR SCREENING MAMMOGRAM FOR MALIGNANT NEOPLASM OF BREAST: ICD-10-CM

## 2024-02-27 PROCEDURE — 77063 BREAST TOMOSYNTHESIS BI: CPT | Mod: BILATERAL PROCEDURE | Performed by: RADIOLOGY

## 2024-02-27 PROCEDURE — 77067 SCR MAMMO BI INCL CAD: CPT

## 2024-02-27 PROCEDURE — 77067 SCR MAMMO BI INCL CAD: CPT | Mod: BILATERAL PROCEDURE | Performed by: RADIOLOGY

## 2024-03-09 ENCOUNTER — ANTICOAGULATION - WARFARIN VISIT (OUTPATIENT)
Dept: CARDIOLOGY | Facility: CLINIC | Age: 49
End: 2024-03-09
Payer: COMMERCIAL

## 2024-03-09 DIAGNOSIS — I82.4Z2 ACUTE DEEP VEIN THROMBOSIS (DVT) OF DISTAL VEIN OF LEFT LOWER EXTREMITY (MULTI): Primary | ICD-10-CM

## 2024-03-09 LAB
POC INR: 2
POC PROTHROMBIN TIME: NORMAL

## 2024-03-09 PROCEDURE — 85610 PROTHROMBIN TIME: CPT | Mod: QW

## 2024-03-09 PROCEDURE — 99211 OFF/OP EST MAY X REQ PHY/QHP: CPT | Performed by: INTERNAL MEDICINE

## 2024-03-09 NOTE — PROGRESS NOTES
Patient identification verified with 2 identifiers.    Location: [facility] [address] [address] [phone] option #1     Referring Physician: LALY  Enrollment/ Re-enrollment date: 24   INR Goal: 2.0-3.0  INR monitoring is per [facility] protocol.  Anticoagulation Medication: warfarin  Indication: DVT    Subjective   Bleeding signs/symptoms: No    Bruising: No   Major bleeding event: No  Thrombosis signs/symptoms: No  Thromboembolic event: No  Missed doses: No  Extra doses: No  Medication changes: No  Dietary changes: No  Change in health: No  Change in activity: No  Alcohol: No  Other concerns: No    Upcoming Surgeries:  Does the Patient Have any upcoming surgeries that require interruption in anticoagulation therapy? no  Does the patient require bridging? no      Anticoagulation Summary  As of 3/9/2024      INR goal:  2.0-3.0   TTR:  73.2 % (5.5 mo)   INR used for dosin.00 (3/9/2024)   Weekly warfarin total:  50 mg               Assessment/Plan    therapeutic     1. New dose: no change   2. Next INR: 4 weeks as patient needs a Saturday appointment      Education provided to patient during the visit:  Patient instructed to call in interim with questions, concerns and changes.

## 2024-03-11 ENCOUNTER — APPOINTMENT (OUTPATIENT)
Dept: RHEUMATOLOGY | Facility: CLINIC | Age: 49
End: 2024-03-11
Payer: COMMERCIAL

## 2024-03-27 ENCOUNTER — OFFICE VISIT (OUTPATIENT)
Dept: RHEUMATOLOGY | Facility: CLINIC | Age: 49
End: 2024-03-27
Payer: COMMERCIAL

## 2024-03-27 VITALS
BODY MASS INDEX: 31.57 KG/M2 | HEART RATE: 69 BPM | WEIGHT: 184 LBS | SYSTOLIC BLOOD PRESSURE: 170 MMHG | DIASTOLIC BLOOD PRESSURE: 84 MMHG

## 2024-03-27 DIAGNOSIS — M25.50 ARTHRALGIA, UNSPECIFIED JOINT: Primary | ICD-10-CM

## 2024-03-27 DIAGNOSIS — M05.9 RHEUMATOID ARTHRITIS WITH POSITIVE RHEUMATOID FACTOR, INVOLVING UNSPECIFIED SITE (MULTI): Primary | ICD-10-CM

## 2024-03-27 PROCEDURE — 99214 OFFICE O/P EST MOD 30 MIN: CPT | Performed by: INTERNAL MEDICINE

## 2024-03-27 PROCEDURE — 3079F DIAST BP 80-89 MM HG: CPT | Performed by: INTERNAL MEDICINE

## 2024-03-27 PROCEDURE — 3077F SYST BP >= 140 MM HG: CPT | Performed by: INTERNAL MEDICINE

## 2024-03-27 PROCEDURE — 1036F TOBACCO NON-USER: CPT | Performed by: INTERNAL MEDICINE

## 2024-03-27 RX ORDER — LEFLUNOMIDE 20 MG/1
20 TABLET ORAL DAILY
Qty: 30 TABLET | Refills: 3 | Status: SHIPPED | OUTPATIENT
Start: 2024-03-27

## 2024-03-27 RX ORDER — GABAPENTIN 300 MG/1
300 CAPSULE ORAL 2 TIMES DAILY
Qty: 60 CAPSULE | Refills: 11 | Status: SHIPPED | OUTPATIENT
Start: 2024-03-27 | End: 2025-03-27

## 2024-03-28 NOTE — PROGRESS NOTES
She presents for follow-up evaluation with continued complaints of rather diffuse musculoskeletal pain particularly involving her left knee shoulders and hands.  She has generalized morning stiffness that improves with activity.  She also notes lower back pain for which she has been sleeping on heating pad that has a timing mechanism.  She has been taking gabapentin 300 mg twice per day and leflunomide 20 mg 6 days/week.    Examination shows good passive range of motion of the upper and lower extremity joints with tenderness on range of motion of the shoulders, left hip and left knee.  There is a positive Tinel's sign at both wrists, right more than left.  Muscle strength 4-5/5 in the left hip flexor and 5/5 in the right hip flexor 5/5 in the knee flexors and knee extensors bilaterally.  There is mild pain in the left knee with flexion and extension against resistance.    X-ray left knee (2/26/2024) early degenerative arthritis changes.  Laboratory (11/3/2023) BUN 11, creatinine 0.93, glucose 85, CRP 0.14, WBC 4.7, hemoglobin 10.5, hematocrit 34.8, MCV 78, MCHC 30.2, platelets 148, leg alkaline phosphatase 54, AST 18, ALT 15, calcium 9.0, albumin 4.5.    She has history of rheumatoid arthritis/Sjogren syndrome, left lower extremity deep vein thrombosis, polycystic ovarian syndrome, left carpal tunnel syndrome.  She has a mild microcytic anemia.    She is to continue leflunomide 20 mg daily 6 days/week as gabapentin 300 mg twice per day.  She is to wear carpal tunnel splints at bedtime.  She is to have laboratory for follow-up of leflunomide therapy.  She is to return at the next available office appointment.

## 2024-04-06 ENCOUNTER — ANTICOAGULATION - WARFARIN VISIT (OUTPATIENT)
Dept: CARDIOLOGY | Facility: CLINIC | Age: 49
End: 2024-04-06
Payer: COMMERCIAL

## 2024-04-06 DIAGNOSIS — I82.4Z2 ACUTE DEEP VEIN THROMBOSIS (DVT) OF DISTAL VEIN OF LEFT LOWER EXTREMITY (MULTI): Primary | ICD-10-CM

## 2024-04-06 LAB
POC INR: 3
POC PROTHROMBIN TIME: NORMAL

## 2024-04-06 PROCEDURE — 99211 OFF/OP EST MAY X REQ PHY/QHP: CPT | Performed by: INTERNAL MEDICINE

## 2024-04-06 PROCEDURE — 85610 PROTHROMBIN TIME: CPT | Mod: QW

## 2024-04-06 NOTE — PROGRESS NOTES
Patient identification verified with 2 identifiers.    Location: [facility] [address] [address] [phone] option #1     Referring Physician: LALY  Enrollment/ Re-enrollment date: 7/26/24   INR Goal: 2.0-3.0  INR monitoring is per [facility] protocol.  Anticoagulation Medication: warfarin  Indication: DVT    Subjective   Bleeding signs/symptoms: No    Bruising: No   Major bleeding event: No  Thrombosis signs/symptoms: No  Thromboembolic event: No  Missed doses: No  Extra doses: No  Medication changes: No  Dietary changes: No  Change in health: No  Change in activity: No  Alcohol: No  Other concerns: No    Upcoming Surgeries:  Does the Patient Have any upcoming surgeries that require interruption in anticoagulation therapy? no  Does the patient require bridging? no      Anticoagulation Summary  As of 4/6/2024      INR goal:  2.0-3.0   TTR:  77.1 % (6.5 mo)   INR used for dosing:  3.00 (4/6/2024)   Weekly warfarin total:  50 mg               Assessment/Plan    therapeutic     1. New dose: no change   2. Next INR: 4 weeks as patient needs a Saturday appointment      Education provided to patient during the visit:  Patient instructed to call in interim with questions, concerns and changes.

## 2024-05-04 ENCOUNTER — ANTICOAGULATION - WARFARIN VISIT (OUTPATIENT)
Dept: CARDIOLOGY | Facility: CLINIC | Age: 49
End: 2024-05-04
Payer: COMMERCIAL

## 2024-05-04 DIAGNOSIS — I82.4Z2 ACUTE DEEP VEIN THROMBOSIS (DVT) OF DISTAL VEIN OF LEFT LOWER EXTREMITY (MULTI): Primary | ICD-10-CM

## 2024-05-04 LAB
POC INR: 3.3
POC PROTHROMBIN TIME: NORMAL

## 2024-05-04 PROCEDURE — 99211 OFF/OP EST MAY X REQ PHY/QHP: CPT

## 2024-05-04 PROCEDURE — 85610 PROTHROMBIN TIME: CPT | Mod: QW

## 2024-05-04 NOTE — PROGRESS NOTES
Patient identification verified with 2 identifiers.    Location: [facility] at [facility] - [address] [address] [phone] option #1     Referring Physician: LALY  Enrollment/ Re-enrollment date: 7/26/24   INR Goal: 2.0-3.0  INR monitoring is per [facility] protocol.  Anticoagulation Medication: warfarin  Indication: DVT    Subjective   Bleeding signs/symptoms: No    Bruising: No   Major bleeding event: No  Thrombosis signs/symptoms: No  Thromboembolic event: No  Missed doses: No  Extra doses: No  Medication changes: No  Dietary changes: No  Change in health: No  Change in activity: No  Alcohol: No  Other concerns: No    Upcoming Surgeries:  Does the Patient Have any upcoming surgeries that require interruption in anticoagulation therapy? no  Does the patient require bridging? no      Anticoagulation Summary  As of 5/4/2024      INR goal:  2.0-3.0   TTR:  67.4% (7.4 mo)   INR used for dosing:  3.30 (5/4/2024)   Weekly warfarin total:  50 mg               Assessment/Plan    Sub therapeutic in setting of decreased green vegetables     1. New dose: no change   2. Next INR:  6 days      Education provided to patient during the visit:  Patient instructed to call in interim with questions, concerns and changes.

## 2024-05-10 ENCOUNTER — ANTICOAGULATION - WARFARIN VISIT (OUTPATIENT)
Dept: CARDIOLOGY | Facility: CLINIC | Age: 49
End: 2024-05-10
Payer: COMMERCIAL

## 2024-05-10 DIAGNOSIS — I82.4Z2 ACUTE DEEP VEIN THROMBOSIS (DVT) OF DISTAL VEIN OF LEFT LOWER EXTREMITY (MULTI): Primary | ICD-10-CM

## 2024-05-10 LAB
POC INR: 4.8
POC PROTHROMBIN TIME: NORMAL

## 2024-05-10 PROCEDURE — 99211 OFF/OP EST MAY X REQ PHY/QHP: CPT

## 2024-05-10 PROCEDURE — 85610 PROTHROMBIN TIME: CPT | Mod: QW

## 2024-05-10 NOTE — PROGRESS NOTES
Patient identification verified with 2 identifiers.    Location: [facility] - [address] [address] [phone] option #1     Referring Physician: LALY  Enrollment/ Re-enrollment date: 24   INR Goal: 2.0-3.0  INR monitoring is per [facility] protocol.  Anticoagulation Medication: warfarin  Indication: DVT    Subjective   Bleeding signs/symptoms: No    Bruising: No   Major bleeding event: No  Thrombosis signs/symptoms: No  Thromboembolic event: No  Missed doses: No  Extra doses: No  Medication changes: Yes  on fluconazole  Dietary changes: No  Change in health: No  Change in activity: No  Alcohol: No  Other concerns: No    Upcoming Surgeries:  Does the Patient Have any upcoming surgeries that require interruption in anticoagulation therapy? no  Does the patient require bridging? no      Anticoagulation Summary  As of 5/10/2024      INR goal:  2.0-3.0   TTR:  65.5% (7.6 mo)   INR used for dosin.80 (5/10/2024)   Weekly warfarin total:  42.5 mg               Assessment/Plan    Supra therapeutic in setting of fluconazole     1. New dose: HOLD 2 days and decrease weekly dose.  2. Next INR:  8 days pt prefers a Saturday appointment.      Education provided to patient during the visit:  Patient instructed to call in interim with questions, concerns and changes.

## 2024-05-18 ENCOUNTER — ANTICOAGULATION - WARFARIN VISIT (OUTPATIENT)
Dept: CARDIOLOGY | Facility: CLINIC | Age: 49
End: 2024-05-18
Payer: COMMERCIAL

## 2024-05-18 DIAGNOSIS — I82.4Y2 ACUTE VENOUS EMBOLISM AND THROMBOSIS OF DEEP VESSELS OF PROXIMAL END OF LEFT LOWER EXTREMITY (MULTI): ICD-10-CM

## 2024-05-18 DIAGNOSIS — I82.492 DEEP VEIN THROMBOSIS (DVT) OF OTHER VEIN OF LEFT LOWER EXTREMITY, UNSPECIFIED CHRONICITY (MULTI): Primary | ICD-10-CM

## 2024-05-18 PROBLEM — I82.4Y9 ACUTE VENOUS EMBOLISM AND THROMBOSIS OF DEEP VESSELS OF PROXIMAL LOWER EXTREMITY (MULTI): Status: ACTIVE | Noted: 2024-05-18

## 2024-05-18 LAB
POC INR: 2.5
POC PROTHROMBIN TIME: NORMAL

## 2024-05-18 PROCEDURE — 85610 PROTHROMBIN TIME: CPT | Mod: QW

## 2024-05-18 PROCEDURE — 99211 OFF/OP EST MAY X REQ PHY/QHP: CPT

## 2024-05-18 NOTE — PROGRESS NOTES
Patient identification verified with 2 identifiers.    Location: [facility] at [facility] - [address] [address] [phone] option #1     Referring Physician: Jazzy Oro MD   Enrollment/ Re-enrollment date: 2025   INR Goal: 2.0-3.0  INR monitoring is per [facility] protocol.  Anticoagulation Medication: warfarin  Indication: Deep Vein Thrombosis (DVT)    Subjective   Bleeding signs/symptoms: No    Bruising: No   Major bleeding event: No  Thrombosis signs/symptoms: No  Thromboembolic event: No  Missed doses: No  Extra doses: No  Medication changes: No  Dietary changes: No  Change in health: No  Change in activity: No  Alcohol: No  Other concerns: No    Upcoming Procedures:  Does the Patient Have any upcoming procedures that require interruption in anticoagulation therapy? no  Does the patient require bridging? no      Anticoagulation Summary  As of 2024      INR goal:  2.0-3.0   TTR:  64.1% (7.9 mo)   INR used for dosin.50 (2024)   Weekly warfarin total:  42.5 mg               Assessment/Plan   Therapeutic     1. New dose: no change    2. Next INR: 2 weeks      Education provided to patient during the visit:  Patient instructed to call in interim with questions, concerns and changes.

## 2024-06-01 ENCOUNTER — ANTICOAGULATION - WARFARIN VISIT (OUTPATIENT)
Dept: CARDIOLOGY | Facility: CLINIC | Age: 49
End: 2024-06-01
Payer: COMMERCIAL

## 2024-06-01 DIAGNOSIS — I82.4Y2 ACUTE VENOUS EMBOLISM AND THROMBOSIS OF DEEP VESSELS OF PROXIMAL END OF LEFT LOWER EXTREMITY (MULTI): Primary | ICD-10-CM

## 2024-06-01 LAB
POC INR: 2.8
POC PROTHROMBIN TIME: NORMAL

## 2024-06-01 PROCEDURE — 85610 PROTHROMBIN TIME: CPT | Mod: QW

## 2024-06-01 PROCEDURE — 99211 OFF/OP EST MAY X REQ PHY/QHP: CPT

## 2024-06-01 NOTE — PROGRESS NOTES
Patient identification verified with 2 identifiers.    Location: [facility] [address] [address] [phone] option #1     Referring Physician: Jazzy Oro MD   Enrollment/ Re-enrollment date: 2025   INR Goal: 2.0-3.0  INR monitoring is per [facility] protocol.  Anticoagulation Medication: warfarin  Indication: Deep Vein Thrombosis (DVT)    Subjective   Bleeding signs/symptoms: No    Bruising: No   Major bleeding event: No  Thrombosis signs/symptoms: No  Thromboembolic event: No  Missed doses: No  Extra doses: No  Medication changes: No  Dietary changes: No  Change in health: No  Change in activity: No  Alcohol: No  Other concerns: No    Upcoming Procedures:  Does the Patient Have any upcoming procedures that require interruption in anticoagulation therapy? no  Does the patient require bridging? no      Anticoagulation Summary  As of 2024      INR goal:  2.0-3.0   TTR:  66.2% (8.3 mo)   INR used for dosin.80 (2024)   Weekly warfarin total:  42.5 mg               Assessment/Plan   Therapeutic     1. New dose: no change    2. Next INR: 4 weeks    Education provided to patient during the visit:  Patient instructed to call in interim with questions, concerns and changes.

## 2024-06-29 ENCOUNTER — LAB (OUTPATIENT)
Dept: LAB | Facility: LAB | Age: 49
End: 2024-06-29
Payer: COMMERCIAL

## 2024-06-29 ENCOUNTER — ANTICOAGULATION - WARFARIN VISIT (OUTPATIENT)
Dept: CARDIOLOGY | Facility: CLINIC | Age: 49
End: 2024-06-29
Payer: COMMERCIAL

## 2024-06-29 DIAGNOSIS — I10 ESSENTIAL (PRIMARY) HYPERTENSION: Primary | ICD-10-CM

## 2024-06-29 DIAGNOSIS — I82.4Y2 ACUTE VENOUS EMBOLISM AND THROMBOSIS OF DEEP VESSELS OF PROXIMAL END OF LEFT LOWER EXTREMITY (MULTI): Primary | ICD-10-CM

## 2024-06-29 DIAGNOSIS — E11.9 TYPE 2 DIABETES MELLITUS WITHOUT COMPLICATIONS (MULTI): ICD-10-CM

## 2024-06-29 DIAGNOSIS — M05.9 RHEUMATOID ARTHRITIS WITH POSITIVE RHEUMATOID FACTOR, INVOLVING UNSPECIFIED SITE (MULTI): ICD-10-CM

## 2024-06-29 DIAGNOSIS — D64.9 ANEMIA, UNSPECIFIED: ICD-10-CM

## 2024-06-29 DIAGNOSIS — F32.A DEPRESSION, UNSPECIFIED: ICD-10-CM

## 2024-06-29 LAB
ALBUMIN SERPL BCP-MCNC: 4.3 G/DL (ref 3.4–5)
ALP SERPL-CCNC: 55 U/L (ref 33–110)
ALT SERPL W P-5'-P-CCNC: 13 U/L (ref 7–45)
ANION GAP SERPL CALC-SCNC: 13 MMOL/L (ref 10–20)
AST SERPL W P-5'-P-CCNC: 16 U/L (ref 9–39)
BASOPHILS # BLD AUTO: 0.04 X10*3/UL (ref 0–0.1)
BASOPHILS NFR BLD AUTO: 0.9 %
BILIRUB SERPL-MCNC: 0.4 MG/DL (ref 0–1.2)
BUN SERPL-MCNC: 14 MG/DL (ref 6–23)
CALCIUM SERPL-MCNC: 9.3 MG/DL (ref 8.6–10.6)
CHLORIDE SERPL-SCNC: 105 MMOL/L (ref 98–107)
CHOLEST SERPL-MCNC: 149 MG/DL (ref 0–199)
CHOLESTEROL/HDL RATIO: 3.8
CO2 SERPL-SCNC: 25 MMOL/L (ref 21–32)
CREAT SERPL-MCNC: 1.19 MG/DL (ref 0.5–1.05)
CRP SERPL-MCNC: 0.12 MG/DL
EGFRCR SERPLBLD CKD-EPI 2021: 56 ML/MIN/1.73M*2
EOSINOPHIL # BLD AUTO: 0.09 X10*3/UL (ref 0–0.7)
EOSINOPHIL NFR BLD AUTO: 1.9 %
ERYTHROCYTE [DISTWIDTH] IN BLOOD BY AUTOMATED COUNT: 21.4 % (ref 11.5–14.5)
EST. AVERAGE GLUCOSE BLD GHB EST-MCNC: 105 MG/DL
FOLATE SERPL-MCNC: 9 NG/ML
GLUCOSE SERPL-MCNC: 104 MG/DL (ref 74–99)
HBA1C MFR BLD: 5.3 %
HCT VFR BLD AUTO: 32.6 % (ref 36–46)
HDLC SERPL-MCNC: 39.3 MG/DL
HGB BLD-MCNC: 10 G/DL (ref 12–16)
HGB RETIC QN: 24 PG (ref 28–38)
HYPOCHROMIA BLD QL SMEAR: NORMAL
IMM GRANULOCYTES # BLD AUTO: 0.01 X10*3/UL (ref 0–0.7)
IMM GRANULOCYTES NFR BLD AUTO: 0.2 % (ref 0–0.9)
IMMATURE RETIC FRACTION: 25.4 %
IRON SATN MFR SERPL: 30 % (ref 25–45)
IRON SERPL-MCNC: 88 UG/DL (ref 35–150)
LDLC SERPL CALC-MCNC: 90 MG/DL
LYMPHOCYTES # BLD AUTO: 2.11 X10*3/UL (ref 1.2–4.8)
LYMPHOCYTES NFR BLD AUTO: 45.5 %
MCH RBC QN AUTO: 23.1 PG (ref 26–34)
MCHC RBC AUTO-ENTMCNC: 30.7 G/DL (ref 32–36)
MCV RBC AUTO: 76 FL (ref 80–100)
MONOCYTES # BLD AUTO: 0.73 X10*3/UL (ref 0.1–1)
MONOCYTES NFR BLD AUTO: 15.7 %
NEUTROPHILS # BLD AUTO: 1.66 X10*3/UL (ref 1.2–7.7)
NEUTROPHILS NFR BLD AUTO: 35.8 %
NON HDL CHOLESTEROL: 110 MG/DL (ref 0–149)
NRBC BLD-RTO: 0 /100 WBCS (ref 0–0)
OVALOCYTES BLD QL SMEAR: NORMAL
PLATELET # BLD AUTO: 149 X10*3/UL (ref 150–450)
POC INR: 1.7
POC PROTHROMBIN TIME: NORMAL
POLYCHROMASIA BLD QL SMEAR: NORMAL
POTASSIUM SERPL-SCNC: 4.5 MMOL/L (ref 3.5–5.3)
PROT SERPL-MCNC: 7.2 G/DL (ref 6.4–8.2)
PROT SERPL-MCNC: 7.2 G/DL (ref 6.4–8.2)
RBC # BLD AUTO: 4.32 X10*6/UL (ref 4–5.2)
RBC MORPH BLD: NORMAL
RETICS #: 0.09 X10*6/UL (ref 0.02–0.08)
RETICS/RBC NFR AUTO: 2.1 % (ref 0.5–2)
SCHISTOCYTES BLD QL SMEAR: NORMAL
SODIUM SERPL-SCNC: 138 MMOL/L (ref 136–145)
TIBC SERPL-MCNC: 289 UG/DL (ref 240–445)
TRIGL SERPL-MCNC: 98 MG/DL (ref 0–149)
TSH SERPL-ACNC: 2.11 MIU/L (ref 0.44–3.98)
UIBC SERPL-MCNC: 201 UG/DL (ref 110–370)
VIT B12 SERPL-MCNC: 512 PG/ML (ref 211–911)
VLDL: 20 MG/DL (ref 0–40)
WBC # BLD AUTO: 4.6 X10*3/UL (ref 4.4–11.3)

## 2024-06-29 PROCEDURE — 85025 COMPLETE CBC W/AUTO DIFF WBC: CPT

## 2024-06-29 PROCEDURE — 82746 ASSAY OF FOLIC ACID SERUM: CPT

## 2024-06-29 PROCEDURE — 85045 AUTOMATED RETICULOCYTE COUNT: CPT

## 2024-06-29 PROCEDURE — 85610 PROTHROMBIN TIME: CPT | Mod: QW

## 2024-06-29 PROCEDURE — 82607 VITAMIN B-12: CPT

## 2024-06-29 PROCEDURE — 36415 COLL VENOUS BLD VENIPUNCTURE: CPT

## 2024-06-29 PROCEDURE — 86334 IMMUNOFIX E-PHORESIS SERUM: CPT

## 2024-06-29 PROCEDURE — 83550 IRON BINDING TEST: CPT

## 2024-06-29 PROCEDURE — 83540 ASSAY OF IRON: CPT

## 2024-06-29 PROCEDURE — 80053 COMPREHEN METABOLIC PANEL: CPT

## 2024-06-29 PROCEDURE — 86140 C-REACTIVE PROTEIN: CPT

## 2024-06-29 PROCEDURE — 83036 HEMOGLOBIN GLYCOSYLATED A1C: CPT

## 2024-06-29 PROCEDURE — 80061 LIPID PANEL: CPT

## 2024-06-29 PROCEDURE — 80165 DIPROPYLACETIC ACID FREE: CPT

## 2024-06-29 PROCEDURE — 84443 ASSAY THYROID STIM HORMONE: CPT

## 2024-06-29 PROCEDURE — 84155 ASSAY OF PROTEIN SERUM: CPT

## 2024-06-29 PROCEDURE — 99211 OFF/OP EST MAY X REQ PHY/QHP: CPT

## 2024-06-29 PROCEDURE — 84165 PROTEIN E-PHORESIS SERUM: CPT

## 2024-06-29 NOTE — PROGRESS NOTES
Patient identification verified with 2 identifiers.    Location: [facility] at [facility] - [address] [address] [phone] option #1     Referring Physician: Jazzy Oro MD   Enrollment/ Re-enrollment date: 2025   INR Goal: 2.0-3.0  INR monitoring is per [facility] protocol.  Anticoagulation Medication: warfarin  Indication: Deep Vein Thrombosis (DVT)    Subjective   Bleeding signs/symptoms: No    Bruising: No   Major bleeding event: No  Thrombosis signs/symptoms: No  Thromboembolic event: No  Missed doses: No  Extra doses: Yes  PT THINKS SHE MAY HAVE TAKEN MORE WARFARIN DAILY AS HER FAMILY MEMBER INCREASED IT BY MISTAKE  Medication changes: No  Dietary changes: Yes  INCREASED GREEN VEGETABLES  Change in health: No  Change in activity: No  Alcohol: No  Other concerns: No    Upcoming Procedures:  Does the Patient Have any upcoming procedures that require interruption in anticoagulation therapy? no  Does the patient require bridging? no      Anticoagulation Summary  As of 2024      INR goal:  2.0-3.0   TTR:  66.9% (9.3 mo)   INR used for dosin.70 (2024)   Weekly warfarin total:  42.5 mg               Assessment/Plan   Subtherapeutic     1. New dose: no change    2. Next INR: 6 days    Education provided to patient during the visit:  Patient instructed to call in interim with questions, concerns and changes.

## 2024-07-01 LAB — PATH REVIEW-CBC DIFFERENTIAL: NORMAL

## 2024-07-02 LAB
ALBUMIN: 4.1 G/DL (ref 3.4–5)
ALPHA 1 GLOBULIN: 0.3 G/DL (ref 0.2–0.6)
ALPHA 2 GLOBULIN: 0.6 G/DL (ref 0.4–1.1)
BETA GLOBULIN: 0.8 G/DL (ref 0.5–1.2)
GAMMA GLOBULIN: 1.3 G/DL (ref 0.5–1.4)
IMMUNOFIXATION COMMENT: NORMAL
PATH REVIEW - SERUM IMMUNOFIXATION: NORMAL
PATH REVIEW-SERUM PROTEIN ELECTROPHORESIS: NORMAL
PROTEIN ELECTROPHORESIS COMMENT: NORMAL
SCAN RESULT: NORMAL
VALPROATE FREE SERPL-MCNC: 5.2 UG/ML (ref 4–30)

## 2024-07-05 ENCOUNTER — ANTICOAGULATION - WARFARIN VISIT (OUTPATIENT)
Dept: CARDIOLOGY | Facility: CLINIC | Age: 49
End: 2024-07-05
Payer: COMMERCIAL

## 2024-07-05 ENCOUNTER — TELEPHONE (OUTPATIENT)
Dept: INTERNAL MEDICINE | Facility: HOSPITAL | Age: 49
End: 2024-07-05

## 2024-07-05 DIAGNOSIS — N76.0 BACTERIAL VAGINOSIS: Primary | ICD-10-CM

## 2024-07-05 DIAGNOSIS — I82.4Y2 ACUTE VENOUS EMBOLISM AND THROMBOSIS OF DEEP VESSELS OF PROXIMAL END OF LEFT LOWER EXTREMITY (MULTI): Primary | ICD-10-CM

## 2024-07-05 DIAGNOSIS — B96.89 BACTERIAL VAGINOSIS: Primary | ICD-10-CM

## 2024-07-05 LAB
POC INR: 1.6
POC PROTHROMBIN TIME: NORMAL

## 2024-07-05 PROCEDURE — 85610 PROTHROMBIN TIME: CPT | Mod: QW

## 2024-07-05 PROCEDURE — 99211 OFF/OP EST MAY X REQ PHY/QHP: CPT

## 2024-07-05 RX ORDER — METRONIDAZOLE 500 MG/1
500 TABLET ORAL 2 TIMES DAILY
Qty: 14 TABLET | Refills: 0 | Status: SHIPPED | OUTPATIENT
Start: 2024-07-05 | End: 2024-07-12

## 2024-07-05 NOTE — PROGRESS NOTES
Patient identification verified with 2 identifiers.    Location: [facility] at [facility] [address] [address] [phone] option #1     Referring Physician: Jazzy Oro MD   Enrollment/ Re-enrollment date: 2025   INR Goal: 2.0-3.0  INR monitoring is per AMS protocol.  Anticoagulation Medication: warfarin  Indication: Deep Vein Thrombosis (DVT)    Subjective   Bleeding signs/symptoms: No    Bruising: No   Major bleeding event: No  Thrombosis signs/symptoms: No  Thromboembolic event: No  Missed doses: No  Extra doses: No  Medication changes: No  Dietary changes: Yes  eating more fruits and vegetables  Change in health: No  Change in activity: No  Alcohol: No  Other concerns: No    Upcoming Procedures:  Does the Patient Have any upcoming procedures that require interruption in anticoagulation therapy? no  Does the patient require bridging? no      Anticoagulation Summary  As of 2024      INR goal:  2.0-3.0   TTR:  65.4% (9.5 mo)   INR used for dosin.60 (2024)   Weekly warfarin total:  47.5 mg               Assessment/Plan   Subtherapeutic     1. New dose: increase  2. Next INR: 8 days    Education provided to patient during the visit:  Patient instructed to call in interim with questions, concerns and changes.

## 2024-07-13 ENCOUNTER — ANTICOAGULATION - WARFARIN VISIT (OUTPATIENT)
Dept: CARDIOLOGY | Facility: CLINIC | Age: 49
End: 2024-07-13
Payer: COMMERCIAL

## 2024-07-13 DIAGNOSIS — I82.4Y2 ACUTE VENOUS EMBOLISM AND THROMBOSIS OF DEEP VESSELS OF PROXIMAL END OF LEFT LOWER EXTREMITY (MULTI): Primary | ICD-10-CM

## 2024-07-13 LAB
POC INR: 2.5
POC PROTHROMBIN TIME: NORMAL

## 2024-07-13 PROCEDURE — 99211 OFF/OP EST MAY X REQ PHY/QHP: CPT

## 2024-07-13 PROCEDURE — 85610 PROTHROMBIN TIME: CPT | Mod: QW

## 2024-07-13 NOTE — PROGRESS NOTES
Patient identification verified with 2 identifiers.    Location: [facility] at [facility] - [address] [address] [phone] option #1     Referring Physician: Jazzy Oro MD   Enrollment/ Re-enrollment date: 2025   INR Goal: 2.0-3.0  INR monitoring is per AMS protocol.  Anticoagulation Medication: warfarin  Indication: Deep Vein Thrombosis (DVT)    Subjective   Bleeding signs/symptoms: No    Bruising: No   Major bleeding event: No  Thrombosis signs/symptoms: No  Thromboembolic event: No  Missed doses: No  Extra doses: No  Medication changes: Yes  on Flagyl for 3 more days  Dietary changes: No  Change in health: No  Change in activity: No  Alcohol: No  Other concerns: No    Upcoming Procedures:  Does the Patient Have any upcoming procedures that require interruption in anticoagulation therapy? no  Does the patient require bridging? no      Anticoagulation Summary  As of 2024      INR goal:  2.0-3.0   TTR:  65.1% (9.7 mo)   INR used for dosin.50 (2024)   Weekly warfarin total:  47.5 mg               Assessment/Plan       Education provided to patient during the visit:  Patient instructed to call in interim with questions, concerns and changes.

## 2024-07-17 NOTE — PROGRESS NOTES
Patient identification verified with 2 identifiers.    Location: [facility] at [facility] - [address] [address] [phone] option #1     Referring Physician: LALY  Enrollment/ Re-enrollment date: 7/26/24   INR Goal: 2.0-3.0  INR monitoring is per [facility] protocol.  Anticoagulation Medication: Jantoven  Indication: DVT    Subjective   Bleeding signs/symptoms: No    Bruising: No   Major bleeding event: No  Thrombosis signs/symptoms: No  Thromboembolic event: No  Missed doses: No  Extra doses: No  Medication changes: Yes  was on Metronidazole and is now off  Dietary changes: No  Change in health: No  Change in activity: No  Alcohol: No  Other concerns: No    Upcoming Surgeries:  Does the Patient Have any upcoming surgeries that require interruption in anticoagulation therapy? no  Does the patient require bridging? no      Anticoagulation Summary  As of 10/6/2023      INR goal:  2.0-3.0   TTR:  --   INR used for dosing:                 Assessment/Plan   Supratherapeutic     1. New dose: no change    2. Next INR: 2 weeks      Education provided to patient during the visit:  Patient instructed to call in interim with questions, concerns and changes.   Patient educated on interactions between medications and warfarin.   Patient educated on dietary consistency in vitamin k consumption.   Patient educated on affects of alcohol consumption while taking warfarin.   Patient educated on signs of bleeding/clotting.   Patient educated on compliance with dosing, follow up appointments, and prescribed plan of care.         No assistance needed

## 2024-07-19 ENCOUNTER — TELEPHONE (OUTPATIENT)
Dept: RHEUMATOLOGY | Facility: CLINIC | Age: 49
End: 2024-07-19
Payer: COMMERCIAL

## 2024-07-23 ENCOUNTER — OFFICE VISIT (OUTPATIENT)
Dept: OBSTETRICS AND GYNECOLOGY | Facility: CLINIC | Age: 49
End: 2024-07-23
Payer: COMMERCIAL

## 2024-07-23 VITALS
WEIGHT: 184 LBS | DIASTOLIC BLOOD PRESSURE: 99 MMHG | SYSTOLIC BLOOD PRESSURE: 189 MMHG | BODY MASS INDEX: 31.41 KG/M2 | HEIGHT: 64 IN

## 2024-07-23 DIAGNOSIS — Z01.419 WOMEN'S ANNUAL ROUTINE GYNECOLOGICAL EXAMINATION: Primary | ICD-10-CM

## 2024-07-23 PROCEDURE — 3080F DIAST BP >= 90 MM HG: CPT | Performed by: OBSTETRICS & GYNECOLOGY

## 2024-07-23 PROCEDURE — 88175 CYTOPATH C/V AUTO FLUID REDO: CPT

## 2024-07-23 PROCEDURE — 99396 PREV VISIT EST AGE 40-64: CPT | Performed by: OBSTETRICS & GYNECOLOGY

## 2024-07-23 PROCEDURE — 3077F SYST BP >= 140 MM HG: CPT | Performed by: OBSTETRICS & GYNECOLOGY

## 2024-07-23 PROCEDURE — 3008F BODY MASS INDEX DOCD: CPT | Performed by: OBSTETRICS & GYNECOLOGY

## 2024-07-23 PROCEDURE — 1036F TOBACCO NON-USER: CPT | Performed by: OBSTETRICS & GYNECOLOGY

## 2024-07-23 NOTE — PROGRESS NOTES
"Julianne Baxter is a 49 y.o. female who presents with a chief complaint of Annual Exam      SUBJECTIVE  annual    No past medical history on file.  No past surgical history on file.  Social History     Socioeconomic History    Marital status: Single   Tobacco Use    Smoking status: Never    Smokeless tobacco: Never   Vaping Use    Vaping status: Never Used   Substance and Sexual Activity    Alcohol use: Yes     Comment: occasional    Drug use: Never    Sexual activity: Yes     Birth control/protection: Implant     Family History   Problem Relation Name Age of Onset    Asthma Mother      Epilepsy Mother      Diabetes Father      Hypertension Father      Hyperlipidemia Father      Breast cancer Father's Sister         OB History    Para Term  AB Living   0 0 0         SAB IAB Ectopic Multiple Live Births                   OBJECTIVE  Allergies   Allergen Reactions    Lisinopril Swelling     Facial Swelling       (Not in a hospital admission)       Review of Systems  History obtained from the patient  General ROS: negative  Psychological ROS: negative  Gastrointestinal ROS: no abdominal pain, change in bowel habits, or black or bloody stools  Musculoskeletal ROS: negative  Physical Exam  General Appearance: awake, alert, oriented, in no acute distress, well developed, well nourished, and in no acute distress  Skin: there are no suspicious lesions or rashes of concern, skin color, texture, turgor are normal; there are no bruises, rashes or lesions.  Head/Face: NCAT  Eyes: No gross abnormalities., PERRL, and EOMI  Neck: neck- supple, no mass, non-tender  Back: no pain to palpation  Breast: BREAST EXAM: normal  Abdomen: Soft, non-tender, normal bowel sounds; no bruits, organomegaly or masses.  Extremities: Extremities warm to touch, pink, with no edema.  Musculoskeletal: negative  Urogen: External genitalia: Normal, Vagina: Normal, Cervix: Normal, and Bimanual exam: Normal    BP (!) 189/99   Ht 1.626 m (5' 4\") "   Wt 83.5 kg (184 lb)   BMI 31.58 kg/m²    Problem List Items Addressed This Visit    None  Visit Diagnoses       Women's annual routine gynecological examination    -  Primary    Relevant Orders    THINPREP PAP

## 2024-07-27 ENCOUNTER — ANTICOAGULATION - WARFARIN VISIT (OUTPATIENT)
Dept: CARDIOLOGY | Facility: CLINIC | Age: 49
End: 2024-07-27
Payer: COMMERCIAL

## 2024-07-27 DIAGNOSIS — I82.4Y2 ACUTE VENOUS EMBOLISM AND THROMBOSIS OF DEEP VESSELS OF PROXIMAL END OF LEFT LOWER EXTREMITY (MULTI): Primary | ICD-10-CM

## 2024-07-27 LAB
POC INR: 2.9
POC PROTHROMBIN TIME: NORMAL

## 2024-07-27 PROCEDURE — 85610 PROTHROMBIN TIME: CPT | Mod: QW

## 2024-07-27 PROCEDURE — 99211 OFF/OP EST MAY X REQ PHY/QHP: CPT

## 2024-07-27 NOTE — PROGRESS NOTES
Patient identification verified with 2 identifiers.    Location: [facility] at [facility] [address] [address] [phone] option #1     Referring Physician: Jazzy Oro MD   Enrollment/ Re-enrollment date: 2025   INR Goal: 2.0-3.0  INR monitoring is per AMS protocol.  Anticoagulation Medication: warfarin  Indication: Deep Vein Thrombosis (DVT)    Subjective   Bleeding signs/symptoms: No    Bruising: No   Major bleeding event: No  Thrombosis signs/symptoms: No  Thromboembolic event: No  Missed doses: No  Extra doses: No  Medication changes: No  Dietary changes: No  Change in health: No  Change in activity: No  Alcohol: No  Other concerns: No    Upcoming Procedures:  Does the Patient Have any upcoming procedures that require interruption in anticoagulation therapy? no  Does the patient require bridging? no      Anticoagulation Summary  As of 2024      INR goal:  2.0-3.0   TTR:  66.7% (10.2 mo)   INR used for dosin.90 (2024)   Weekly warfarin total:  47.5 mg               Assessment/Plan   THERAPEUTIC  MAINTAIN WEEKLY DOSE  RTC IN 4 WEEKS    Education provided to patient during the visit:  Patient instructed to call in interim with questions, concerns and changes.

## 2024-07-31 LAB
CYTOLOGY CMNT CVX/VAG CYTO-IMP: NORMAL
LAB AP HPV GENOTYPE QUESTION: YES
LAB AP HPV HR: NORMAL
LABORATORY COMMENT REPORT: NORMAL
PATH REPORT.TOTAL CANCER: NORMAL

## 2024-08-02 ENCOUNTER — APPOINTMENT (OUTPATIENT)
Dept: OBSTETRICS AND GYNECOLOGY | Facility: CLINIC | Age: 49
End: 2024-08-02
Payer: COMMERCIAL

## 2024-08-02 VITALS — WEIGHT: 180 LBS | BODY MASS INDEX: 30.9 KG/M2 | DIASTOLIC BLOOD PRESSURE: 104 MMHG | SYSTOLIC BLOOD PRESSURE: 170 MMHG

## 2024-08-02 DIAGNOSIS — B96.89 BACTERIAL VAGINOSIS: ICD-10-CM

## 2024-08-02 DIAGNOSIS — N76.2 ACUTE VULVITIS: Primary | ICD-10-CM

## 2024-08-02 DIAGNOSIS — R89.4 POSITIVE TEST FOR HERPES SIMPLEX VIRUS ANTIBODY: ICD-10-CM

## 2024-08-02 DIAGNOSIS — N76.0 BACTERIAL VAGINOSIS: ICD-10-CM

## 2024-08-02 DIAGNOSIS — Z12.31 VISIT FOR SCREENING MAMMOGRAM: ICD-10-CM

## 2024-08-02 PROCEDURE — 3077F SYST BP >= 140 MM HG: CPT | Performed by: OBSTETRICS & GYNECOLOGY

## 2024-08-02 PROCEDURE — 99214 OFFICE O/P EST MOD 30 MIN: CPT | Performed by: OBSTETRICS & GYNECOLOGY

## 2024-08-02 PROCEDURE — 1036F TOBACCO NON-USER: CPT | Performed by: OBSTETRICS & GYNECOLOGY

## 2024-08-02 PROCEDURE — 3080F DIAST BP >= 90 MM HG: CPT | Performed by: OBSTETRICS & GYNECOLOGY

## 2024-08-02 RX ORDER — METRONIDAZOLE 500 MG/1
500 TABLET ORAL 2 TIMES DAILY
Qty: 14 TABLET | Refills: 1 | Status: SHIPPED | OUTPATIENT
Start: 2024-08-02 | End: 2024-08-09

## 2024-08-02 RX ORDER — VALACYCLOVIR HYDROCHLORIDE 500 MG/1
500 TABLET, FILM COATED ORAL 2 TIMES DAILY
Qty: 30 TABLET | Refills: 2 | Status: SHIPPED | OUTPATIENT
Start: 2024-08-02 | End: 2024-08-09

## 2024-08-02 RX ORDER — FLUCONAZOLE 150 MG/1
150 TABLET ORAL ONCE
Qty: 2 TABLET | Refills: 2 | Status: SHIPPED | OUTPATIENT
Start: 2024-08-02 | End: 2024-08-02

## 2024-08-02 RX ORDER — CLOTRIMAZOLE AND BETAMETHASONE DIPROPIONATE 10; .64 MG/G; MG/G
CREAM TOPICAL DAILY
Qty: 15 G | Refills: 3 | Status: SHIPPED | OUTPATIENT
Start: 2024-08-02

## 2024-08-02 RX ORDER — CLOTRIMAZOLE AND BETAMETHASONE DIPROPIONATE 10; .64 MG/G; MG/G
CREAM TOPICAL
COMMUNITY
Start: 2024-02-19 | End: 2024-08-02 | Stop reason: SDUPTHER

## 2024-08-02 NOTE — PROGRESS NOTES
Julianne Baxter is a 49 y.o. female who presents with a chief complaint of Annual Exam (Was seen with Tiana for her annual pap -./Wants nto follow up with results and a refill and some medications)  SUBJECTIVE  She is here to discuss follow-up and refill her medications.  She was seen 2024 with another provider and her Pap smear was negative.  She does discuss her perimenopausal bleeding, she will skip 4 to 6 months at a time.  She typically has light menses 2 or 3 times a year.    We discussed the positive HSV 1 and 2 IgG antibodies  noted in 2023.  She is anxious about possibly having an outbreak but she has never had one to her knowledge.  She prefers to have a medication available in case she becomes symptomatic.    She does have frequent yeast infections and requests a refill of Diflucan and then Lotrisone has also been very helpful externally.    She does request a refill for metronidazole for occasional BV symptoms and does not desire metronidazole gel.    She has no pain, no dysuria, no change in bowel habits currently.    She is in the process of scheduling her colonoscopy.    No past medical history on file.  No past surgical history on file.  Social History     Socioeconomic History    Marital status: Single   Tobacco Use    Smoking status: Never    Smokeless tobacco: Never   Vaping Use    Vaping status: Never Used   Substance and Sexual Activity    Alcohol use: Yes     Comment: occasional    Drug use: Never    Sexual activity: Yes     Birth control/protection: Implant     Family History   Problem Relation Name Age of Onset    Asthma Mother      Epilepsy Mother      Diabetes Father      Hypertension Father      Hyperlipidemia Father      Breast cancer Father's Sister         OB History    Para Term  AB Living   0 0 0         SAB IAB Ectopic Multiple Live Births                   OBJECTIVE  Allergies   Allergen Reactions    Lisinopril Swelling     Facial Swelling       (Not in a  hospital admission)       Review of Systems  Occasional BV and yeast symptoms.  Physical Exam  General Appearance: well developed, well nourished  Was a consultation visit, no exam was needed.  BP (!) 170/104   Wt 81.6 kg (180 lb)   BMI 30.90 kg/m²    Problem List Items Addressed This Visit    None  This is a 49-year-old female with perimenopausal irregular ovulation.  She will call me if there is a change in her pattern.    She request refills of Lotrisone, metronidazole tablet, and Diflucan.  We discussed initiating a prescription for valacyclovir in the circumstance of positive HSV symptoms.    Her routine mammogram is due in February 2025.    I will see her for her routine exam in 2025 or sooner as needed.

## 2024-08-21 ENCOUNTER — ANTICOAGULATION - WARFARIN VISIT (OUTPATIENT)
Dept: CARDIOLOGY | Facility: CLINIC | Age: 49
End: 2024-08-21
Payer: COMMERCIAL

## 2024-08-21 DIAGNOSIS — I82.4Y2 ACUTE VENOUS EMBOLISM AND THROMBOSIS OF DEEP VESSELS OF PROXIMAL END OF LEFT LOWER EXTREMITY (MULTI): Primary | ICD-10-CM

## 2024-08-23 ENCOUNTER — OFFICE VISIT (OUTPATIENT)
Dept: RHEUMATOLOGY | Facility: CLINIC | Age: 49
End: 2024-08-23
Payer: COMMERCIAL

## 2024-08-23 ENCOUNTER — ANTICOAGULATION - WARFARIN VISIT (OUTPATIENT)
Dept: CARDIOLOGY | Facility: CLINIC | Age: 49
End: 2024-08-23
Payer: COMMERCIAL

## 2024-08-23 VITALS
SYSTOLIC BLOOD PRESSURE: 152 MMHG | WEIGHT: 177.8 LBS | DIASTOLIC BLOOD PRESSURE: 100 MMHG | HEART RATE: 78 BPM | BODY MASS INDEX: 30.35 KG/M2 | HEIGHT: 64 IN | OXYGEN SATURATION: 97 % | TEMPERATURE: 97.8 F

## 2024-08-23 DIAGNOSIS — I82.4Y2 ACUTE VENOUS EMBOLISM AND THROMBOSIS OF DEEP VESSELS OF PROXIMAL END OF LEFT LOWER EXTREMITY (MULTI): Primary | ICD-10-CM

## 2024-08-23 DIAGNOSIS — M05.9 RHEUMATOID ARTHRITIS WITH POSITIVE RHEUMATOID FACTOR, INVOLVING UNSPECIFIED SITE (MULTI): Primary | ICD-10-CM

## 2024-08-23 LAB
POC INR: 2.5
POC PROTHROMBIN TIME: NORMAL

## 2024-08-23 PROCEDURE — 85610 PROTHROMBIN TIME: CPT | Mod: QW

## 2024-08-23 PROCEDURE — 99214 OFFICE O/P EST MOD 30 MIN: CPT | Performed by: INTERNAL MEDICINE

## 2024-08-23 PROCEDURE — 3008F BODY MASS INDEX DOCD: CPT | Performed by: INTERNAL MEDICINE

## 2024-08-23 PROCEDURE — 3080F DIAST BP >= 90 MM HG: CPT | Performed by: INTERNAL MEDICINE

## 2024-08-23 PROCEDURE — 99211 OFF/OP EST MAY X REQ PHY/QHP: CPT

## 2024-08-23 PROCEDURE — 3077F SYST BP >= 140 MM HG: CPT | Performed by: INTERNAL MEDICINE

## 2024-08-23 NOTE — PROGRESS NOTES
She complains of lower back pain that is worse with movement as well as with ambulation.  She has difficulty turning over in bed.  The pain extends into the sole of the left foot.  She has difficulty walking any prolonged distances.  She also notes urinary incontinence when lying in bed.  She has slight dribbling if she coughs or sneezes.  She has not noted any bowel incontinence.    Her lungs, heart, abdomen, and extremities are benign.  Neurological examination shows good muscle strength in both lower extremities.  There are no joint effusions.    Laboratory (6/29/2024) WBC 4.6, hemoglobin 10.0, hematocrit 32.6, MCV 76, MCHC 30.7, platelets 149, BUN 14, creatinine 1.19, glucose 104, calcium 9.3, albumin 4.3, alkaline phosphatase 55, AST 16, ALT 13, TSH 2.11, CRP 0.12, iron 88, TIBC 289, saturation 30%, hemoglobin A1c 5.3%, reticulocyte count 2.1%, folate 9.0, vitamin B12 512, serum protein electrophoresis normal.    She has rheumatoid arthritis/Sjogren's syndrome that appears to be stable.  She has history of left lower extremity deep vein thrombosis, polycystic ovarian syndrome, left carpal tunnel syndrome, microcytic anemia with RBC fragments and elevated reticulocyte count.  She has symptoms of lumbar radiculopathy.    She is to continue with her current medications.  She is referred to physical therapy for assist with management of radicular lower back pain.  She is referred to urology regarding urinary incontinence for evaluation of possible neurogenic bladder.  She is to have laboratory for hemoglobin identification for evaluation of hemolytic anemia.  She is to return at the next available office appointment.

## 2024-08-23 NOTE — PROGRESS NOTES
Patient identification verified with 2 identifiers.    Location: [facility] at [facility] - [address] [address] [phone] option #1     Referring Physician: Jazzy Oro MD   Enrollment/ Re-enrollment date: 2025 **sent 24**  INR Goal: 2.0-3.0  INR monitoring is per AMS protocol.  Anticoagulation Medication: warfarin  Indication: Deep Vein Thrombosis (DVT)    Subjective   Bleeding signs/symptoms: No    Bruising: No   Major bleeding event: No  Thrombosis signs/symptoms: No  Thromboembolic event: No  Missed doses: No  Extra doses: No  Medication changes: No  Dietary changes: No  Change in health: No  Change in activity: No  Alcohol: No  Other concerns: No    Upcoming Procedures:  Does the Patient Have any upcoming procedures that require interruption in anticoagulation therapy? no  Does the patient require bridging? no      Anticoagulation Summary  As of 2024      INR goal:  2.0-3.0   TTR:  69.4% (11.1 mo)   INR used for dosin.50 (2024)   Weekly warfarin total:  47.5 mg               Assessment/Plan   THERAPEUTIC  MAINTAIN WEEKLY DOSE  RTC IN 4 WEEKS    Education provided to patient during the visit:  Patient instructed to call in interim with questions, concerns and changes.

## 2024-08-24 ENCOUNTER — APPOINTMENT (OUTPATIENT)
Dept: CARDIOLOGY | Facility: CLINIC | Age: 49
End: 2024-08-24
Payer: COMMERCIAL

## 2024-09-21 ENCOUNTER — ANTICOAGULATION - WARFARIN VISIT (OUTPATIENT)
Dept: CARDIOLOGY | Facility: CLINIC | Age: 49
End: 2024-09-21
Payer: COMMERCIAL

## 2024-09-21 DIAGNOSIS — I82.4Y2 ACUTE VENOUS EMBOLISM AND THROMBOSIS OF DEEP VESSELS OF PROXIMAL END OF LEFT LOWER EXTREMITY (MULTI): Primary | ICD-10-CM

## 2024-09-21 LAB
POC INR: 2.6
POC PROTHROMBIN TIME: NORMAL

## 2024-09-21 PROCEDURE — 99211 OFF/OP EST MAY X REQ PHY/QHP: CPT

## 2024-09-21 PROCEDURE — 85610 PROTHROMBIN TIME: CPT | Mod: QW

## 2024-09-21 NOTE — PROGRESS NOTES
Patient identification verified with 2 identifiers.    Location: [facility] - [address] [address] [phone] option #1     Referring Physician: Jazzy Oro MD   Enrollment/ Re-enrollment date: 24   INR Goal: 2.0-3.0  INR monitoring is per AMS protocol.  Anticoagulation Medication: warfarin  Indication: Deep Vein Thrombosis (DVT)    Subjective   Bleeding signs/symptoms: No    Bruising: No   Major bleeding event: No  Thrombosis signs/symptoms: No  Thromboembolic event: No  Missed doses: No  Extra doses: No  Medication changes: No  Dietary changes: No  Change in health: No  Change in activity: No  Alcohol: No  Other concerns: No    Upcoming Procedures:  Does the Patient Have any upcoming procedures that require interruption in anticoagulation therapy? no  Does the patient require bridging? no      Anticoagulation Summary  As of 2024      INR goal:  2.0-3.0   TTR:  71.8% (1 y)   INR used for dosin.60 (2024)   Weekly warfarin total:  47.5 mg               Assessment/Plan   Therapeutic     1. New dose: no change    2. Next INR: 1 month      Education provided to patient during the visit:  Patient instructed to call in interim with questions, concerns and changes.

## 2024-09-30 ENCOUNTER — OFFICE VISIT (OUTPATIENT)
Dept: UROLOGY | Facility: CLINIC | Age: 49
End: 2024-09-30
Payer: COMMERCIAL

## 2024-09-30 VITALS
DIASTOLIC BLOOD PRESSURE: 82 MMHG | WEIGHT: 182.3 LBS | HEART RATE: 89 BPM | BODY MASS INDEX: 31.29 KG/M2 | SYSTOLIC BLOOD PRESSURE: 147 MMHG

## 2024-09-30 DIAGNOSIS — M05.9 RHEUMATOID ARTHRITIS WITH POSITIVE RHEUMATOID FACTOR, INVOLVING UNSPECIFIED SITE (MULTI): ICD-10-CM

## 2024-09-30 DIAGNOSIS — R35.1 NOCTURIA: ICD-10-CM

## 2024-09-30 DIAGNOSIS — N81.89 PELVIC FLOOR WEAKNESS: Primary | ICD-10-CM

## 2024-09-30 DIAGNOSIS — N39.44 NOCTURNAL ENURESIS: ICD-10-CM

## 2024-09-30 PROCEDURE — 3079F DIAST BP 80-89 MM HG: CPT | Performed by: OBSTETRICS & GYNECOLOGY

## 2024-09-30 PROCEDURE — 1036F TOBACCO NON-USER: CPT | Performed by: OBSTETRICS & GYNECOLOGY

## 2024-09-30 PROCEDURE — 3077F SYST BP >= 140 MM HG: CPT | Performed by: OBSTETRICS & GYNECOLOGY

## 2024-09-30 PROCEDURE — 99214 OFFICE O/P EST MOD 30 MIN: CPT | Performed by: OBSTETRICS & GYNECOLOGY

## 2024-09-30 NOTE — PROGRESS NOTES
"Subjective   Patient ID: Julianne Baxter is a 49 y.o. female who presents for night time urinary urgency, frequency with incontinence.  \[facility]\""  49 y.o.  patient presenting as a referral from Dr. Guzman  with complaints of night time urinary urgency, frequency with incontinence.    The patient is a known case of rheumatoid arthritis with lower back and hip pain. She has difficulty turning over in bed. The pain extends into the sole of the left foot. She has difficulty walking any prolonged distances.     She denies any significant daytime urgency and frequency, urinating every 3-4 times during the day.  She denies any daytime urge or stress urinary incontinence complaints. She notes sporadic 1-2 episodes of nocturia.  However her main complaint is nocturnal enuresis which happens \"a few times a month\". She does take gabapentin and Zolpidem at night. She denies any History of nephrolithiasis, gross hematuria or chronic recurrent UTIs.    She is sexually active but denies any vaginal complaints, no abnormal vaginal bleeding or discharge. She denies any vaginal dryness or irritation. She denies any bulge complaints, no pressure or pulling.    She denies any bowel related complaints, no fecal or flatal incontinence.    She has no other complaints.      Review of Systems  Constitutional: No fever, No chills and No fatigue.   Eyes: No vision problems and No dryness of the eyes.   ENT: No dry mouth, No hearing loss and No nosebleeds.   Cardiovascular: No chest pain, No palpitations and No orthopnea.   Respiratory: No shortness of breath, No cough and No wheezing.   Gastrointestinal: No abdominal pain, No constipation, No nausea, No diarrhea, No vomiting and No melena.   Genitourinary: As noted in HPI.   Musculoskeletal: No back pain, No myalgias, No muscle weakness, No joint swelling and No leg edema.   Integumentary: No rashes, No skin lesion and No itching.   Neurological: No headache, No numbness and No dizziness. "   Psychiatric: No sleep disturbances, No anxiety and No depression.   Endocrine: No hot flashes, No loss of hair and No hirsutism.   Hematologic/Lymphatic: No swollen glands, No tendency for easy bleeding and No tendency for easy bruising.   All other systems have been reviewed and are negative for complaint.        Objective   Physical Exam  PHYSICAL EXAMINATION:  No LMP recorded. Patient has had an implant.  There is no height or weight on file to calculate BMI.  There were no vitals taken for this visit.  General Appearance: well appearing  Neuro: Alert and oriented   HEENT: mucous membranes moist, neck supple  Resp: No respiratory distress, normal work of breathing  MSK: normal range of motion, gait appropriate    Assessment/Plan   49-year-old with episodic nocturnal enuresis, history of rheumatoid arthritis, hip pain, and Sjogren's.    1.  We discussed at length today the patient's lower urinary tract complaints.  We discussed the importance of fluid management strategies and timed voiding.  She denies any significant daytime complaints and at night notes 1 episode of nocturia.  However she does note episodic nocturnal enuresis when she sleeps heavily.  She is utilizing zolpidem and gabapentin at night.  We discussed that these medications are likely the the reason for these rare episodes.  She is already doing a good job of fluid management strategies at night.  She denies any lower extremity edema and denies any significant CESAR concerns.  We did discuss the possibility of medication therapy including imipramine or a beta 3 agonist therapy given her Sjogren history.  She is not interested in taking any oral therapies at this time.  Similarly she denies any vaginal complaints.  We discussed that her history does not indicate a neurogenic concern for the bladder.  We discussed the benefits of pelvic floor physical therapy and she was provided a new referral and list of providers given her significant hip pain  concerns.    2.  The patient will follow-up in 6 to 8 weeks should she have no benefits with her pelvic floor physical therapy.    BELLE Martinez MD              Scribe Attestation  By signing my name below, I, Debbie Vergara attest that this documentation has been prepared under the direction and in the presence of Prabhu Martinez MD. All medical record entries made by the Scribe were at my direction or personally dictated by me. I have reviewed the chart and agree that the record accurately reflects my personal performance of the history, physical exam, discussion and plan.

## 2024-09-30 NOTE — PATIENT INSTRUCTIONS
Please follow-up with pelvic floor physical therapy at your earliest convenience. You have been provided a referral as well as a list of providers.     Please consider fluid management strategies like sipping water through out the day and timed voiding every 2-3 hours.     Please follow-up as needed to discuss your lower urinary tract symptoms.    Call the clinic with questions or concerns.    [phone]

## 2024-10-09 ENCOUNTER — APPOINTMENT (OUTPATIENT)
Dept: PHYSICAL THERAPY | Facility: CLINIC | Age: 49
End: 2024-10-09
Payer: COMMERCIAL

## 2024-10-10 ENCOUNTER — EVALUATION (OUTPATIENT)
Dept: PHYSICAL THERAPY | Facility: CLINIC | Age: 49
End: 2024-10-10
Payer: COMMERCIAL

## 2024-10-10 DIAGNOSIS — N39.44 NOCTURNAL ENURESIS: ICD-10-CM

## 2024-10-10 DIAGNOSIS — G89.29 CHRONIC MIDLINE LOW BACK PAIN WITH LEFT-SIDED SCIATICA: ICD-10-CM

## 2024-10-10 DIAGNOSIS — R35.1 NOCTURIA: ICD-10-CM

## 2024-10-10 DIAGNOSIS — M54.42 CHRONIC MIDLINE LOW BACK PAIN WITH LEFT-SIDED SCIATICA: ICD-10-CM

## 2024-10-10 DIAGNOSIS — M62.89 MUSCLE TIGHTNESS: ICD-10-CM

## 2024-10-10 DIAGNOSIS — N81.89 PELVIC FLOOR WEAKNESS: Primary | ICD-10-CM

## 2024-10-10 DIAGNOSIS — M62.81 MUSCLE WEAKNESS: ICD-10-CM

## 2024-10-10 DIAGNOSIS — M05.9 RHEUMATOID ARTHRITIS WITH POSITIVE RHEUMATOID FACTOR, INVOLVING UNSPECIFIED SITE (MULTI): ICD-10-CM

## 2024-10-10 PROCEDURE — 97535 SELF CARE MNGMENT TRAINING: CPT | Mod: GP | Performed by: PHYSICAL THERAPIST

## 2024-10-10 PROCEDURE — 97163 PT EVAL HIGH COMPLEX 45 MIN: CPT | Mod: GP | Performed by: PHYSICAL THERAPIST

## 2024-10-10 ASSESSMENT — ENCOUNTER SYMPTOMS
DEPRESSION: 0
OCCASIONAL FEELINGS OF UNSTEADINESS: 1
LOSS OF SENSATION IN FEET: 1

## 2024-10-10 NOTE — PROGRESS NOTES
Physical Therapy  Physical Therapy Pelvic Floor Evaluation    Name: Julianne Baxter  MRN: [id number]  : 1975  Encounter Date: 10/10/2024  Visit Count: 1     Time Calculation  Start Time: 1500  Stop Time: 1600  Time Calculation (min): 60 min    Insurance: [facility]  Visit # 1 of 30 for     Current Problem:  1. Pelvic floor weakness  Referral to Physical Therapy      2. Rheumatoid arthritis with positive rheumatoid factor, involving unspecified site (Multi)  Referral to Physical Therapy      3. Nocturia  Referral to Physical Therapy      4. Nocturnal enuresis  Referral to Physical Therapy      5. Chronic midline low back pain with left-sided sciatica        6. Muscle tightness        7. Muscle weakness            General  Reason for Referral: RA & UI  Precautions  STEADI Fall Risk Score (The score of 4 or more indicates an increased risk of falling): 7  Precautions Comment: Hx of RA  Vital Signs     Pain       Subjective  Chief Complaint: back pain, L knee pain, urinary incontinence/nocturesis  - back pain, chronic, injection this year, no effects  - L knee pain   - urinary incontinence: wakes from the leaking/wetness, can only stop it for short periods, continues to keep leaking on her way to the bathroom  - sensation of urgency with intercourse - then goes to urinate and not much comes out  - unable to walk around grocery store at times due to pain, requires scooter, unable to stand up straight due to pain  Onset: years - worsening over the last year  WILDER: unknown    Current Condition: worse    PAIN  Intensity (0-10): 6  Location: central - Lower Back - wraps into L side and down the lateral knee  Description: sharp, aching    Aggravating Factors: walking, moving, equal weight bearing - posture  Relieving Factors:  heating pad, lumbar support/coccyx pillow,    Relevant Information (PMH & Previous Tests/Imaging): see chart  Previous Interventions/Treatments: Steroid Injection    Prior Level of Function  (PLOF)  Exercise/Physical Activity: walking (30 -45 mins)  Work/School:  for [employer] ([employer]    Treatment Goals = Stop urinary incontinence, control her bladder, pain and leg pain    Cultural or Spiritual Practices: Uatsdin  History of Trauma: bipolar (mental health support) - medication  Safe at Home: yes    OB/GYN HISTORY:   Pregnancies (): 3   Births (Para): 0  Prolapse? No  Painful [place] or vaginal penetration? Pain historically & currently sense of urgency    BLADDER  Frequency of Urination  Daytime:  3-4x/day  Nighttime: at least 1x, 2x more  Screening = Recurrent infections/UTIs? No  Other Symptoms   Trouble initiating urine stream    Urine stream is intermittent or slow   X Trouble emptying bladder completely    Dribbling after urination   X Straining or pushing to empty    Trouble feeling bladder urge/fullness    Trouble holding urine when you get an urge   Urinary Incontinence/Leakage  Present with cough and/or sneeze? No  Average Fluid Intake (glasses/day): water: 11oz x5, OJ/cherry/grapefruit/apple -     BOWEL  Frequency of Bowel Movements: regular    Objective: PF holding/spasm, Back/Core/Hip/LE Weakness, Soft Tissue spasm/pain  Patient was educated on pelvic floor anatomy, function, and dysfunction.   Patient was educated on an orthopedic assessment & external pelvic floor assessment technique and verbalized consent.   The patient is aware they have full autonomy and can stop the assessment at any time.     Standing Assessment:   Posture: forward head, rounded shoulders, increased thoracic kyphosis  SL Stance: unable to tolerate on L or R  DL Squat: sit to stand: bi ue support, increased time and effort  Gait: decreased stance time and step length on L Leg, decreased knee flexion in stance, decreased hip extension bilaterally, compensated trendelenburg    OUTCOMES MEASURE:  [place] Pelvic Dysfunction Screening Tool : positive2    NIH-CPSI  -Pain: 0  - Urinary Symp:  4  - QOL: 10    Modified Oswestry: 18/50    Goals:   Active       PT Problem       PT Goal 1       Start:  10/10/24    Expected End:  01/08/25       Patient will return to prior level of function with minimal to no pain and without limitations for participation in ADLs, health, and exercise.   Patient demonstrate home exercise program adherence to supplement symptom reduction and functional gains made in clinic  Patient will reports minimal to no pain for participation in ADLs and return to PLOF.   Patient will demonstrate coordination of pelvic floor, strength & relaxation wnl for return to ADLs and PLOF without restriction.   Pt will demonstrate improvement on the outcome measure score to demonstrate overall improved functional abilities and quality of life.              Education: home exercise program, plan of care, activity modifications, pain management, and injury pathology  Bladder Habits: Just in Case Pee, Hover over the toilet, relax & breathe, squatty potty use  Hydration Recommendation: 50% of your body wt (pounds) in fluid ounces  Bladder Irritants: caffeine, artificial sweeteners, carbonated beverages, alcohol  Fiber Intake Recommendation: 25-30g/day    Treatments: education  home exercise program, plan of care, activity modifications, pain management, and injury pathology  - bladder training: urinate every 2-4 hours throughout the day    Plan for Next Visit: continue assessment and build hep    Assessment: Patient presents with signs and symptoms consistent with low back pain, leg pain, hip/knee pain, PF dysfunction, urinary incontinence, resulting in limited participation in pain-free ADLs and inability to perform at their prior level of function. Pt would benefit from physical therapy to address the impairments found & listed previously in the objective section in order to return to safe and pain-free ADLs and prior level of function.    Plan:   Planned Interventions include: therapeutic exercise,  self-care home management, manual therapy, therapeutic activities, gait training, neuromuscular coordination, aquatic therapy  Patient and/or family understands and agrees with goals and plan documented: yes  Frequency: 2-4x/month  Duration: 2-6 months     Laurita Morrison, PT

## 2024-10-16 ENCOUNTER — APPOINTMENT (OUTPATIENT)
Dept: PHYSICAL THERAPY | Facility: CLINIC | Age: 49
End: 2024-10-16
Payer: COMMERCIAL

## 2024-10-19 ENCOUNTER — ANTICOAGULATION - WARFARIN VISIT (OUTPATIENT)
Dept: CARDIOLOGY | Facility: CLINIC | Age: 49
End: 2024-10-19
Payer: COMMERCIAL

## 2024-10-19 DIAGNOSIS — I82.4Y2 ACUTE VENOUS EMBOLISM AND THROMBOSIS OF DEEP VESSELS OF PROXIMAL END OF LEFT LOWER EXTREMITY (MULTI): Primary | ICD-10-CM

## 2024-10-19 LAB
POC INR: 2.2
POC PROTHROMBIN TIME: NORMAL

## 2024-10-19 PROCEDURE — 85610 PROTHROMBIN TIME: CPT | Mod: QW | Performed by: INTERNAL MEDICINE

## 2024-10-19 PROCEDURE — 99211 OFF/OP EST MAY X REQ PHY/QHP: CPT

## 2024-10-19 NOTE — PROGRESS NOTES
Patient identification verified with 2 identifiers.    Location: [facility] at [facility] [id number] [address] [phone] option #1     Referring Physician: Matheus Oro MD   Enrollment/ Re-enrollment date: 24 sent again 10/19/24  INR Goal: 2.0-3.0  INR monitoring is per AMS protocol.  Anticoagulation Medication: warfarin  Indication: Deep Vein Thrombosis (DVT)    Subjective   Bleeding signs/symptoms: No    Bruising: No   Major bleeding event: No  Thrombosis signs/symptoms: No  Thromboembolic event: No  Missed doses: No  Extra doses: No  Medication changes: No  Dietary changes: No  Change in health: No  Change in activity: No  Alcohol: No  Other concerns: No    Upcoming Procedures:  Does the Patient Have any upcoming procedures that require interruption in anticoagulation therapy? no  Does the patient require bridging? no      Anticoagulation Summary  As of 10/19/2024      INR goal:  2.0-3.0   TTR:  73.9% (1.1 y)   INR used for dosin.20 (10/19/2024)   Weekly warfarin total:  47.5 mg               Assessment/Plan   Therapeutic     1. New dose: no change    2. Next INR: 1 month      Education provided to patient during the visit:  Patient instructed to call in interim with questions, concerns and changes.

## 2024-10-23 ENCOUNTER — APPOINTMENT (OUTPATIENT)
Dept: PHYSICAL THERAPY | Facility: CLINIC | Age: 49
End: 2024-10-23
Payer: COMMERCIAL

## 2024-11-11 ENCOUNTER — TREATMENT (OUTPATIENT)
Dept: PHYSICAL THERAPY | Facility: CLINIC | Age: 49
End: 2024-11-11
Payer: COMMERCIAL

## 2024-11-11 DIAGNOSIS — N81.89 PELVIC FLOOR WEAKNESS: ICD-10-CM

## 2024-11-11 DIAGNOSIS — G89.29 CHRONIC MIDLINE LOW BACK PAIN WITH LEFT-SIDED SCIATICA: ICD-10-CM

## 2024-11-11 DIAGNOSIS — M62.89 MUSCLE TIGHTNESS: ICD-10-CM

## 2024-11-11 DIAGNOSIS — R35.1 NOCTURIA: ICD-10-CM

## 2024-11-11 DIAGNOSIS — M05.9 RHEUMATOID ARTHRITIS WITH POSITIVE RHEUMATOID FACTOR, INVOLVING UNSPECIFIED SITE (MULTI): Primary | ICD-10-CM

## 2024-11-11 DIAGNOSIS — M62.81 MUSCLE WEAKNESS: ICD-10-CM

## 2024-11-11 DIAGNOSIS — M54.42 CHRONIC MIDLINE LOW BACK PAIN WITH LEFT-SIDED SCIATICA: ICD-10-CM

## 2024-11-11 DIAGNOSIS — N39.44 NOCTURNAL ENURESIS: ICD-10-CM

## 2024-11-11 PROCEDURE — 97112 NEUROMUSCULAR REEDUCATION: CPT | Mod: GP | Performed by: PHYSICAL THERAPIST

## 2024-11-11 PROCEDURE — 97110 THERAPEUTIC EXERCISES: CPT | Mod: GP | Performed by: PHYSICAL THERAPIST

## 2024-11-11 NOTE — PROGRESS NOTES
Physical Therapy  Physical Therapy Pelvic Floor    Name: Julianne Baxter  MRN: [id number]  : 1975  Encounter Date: 2024  Visit Count: 2     Time Calculation  Start Time: 1540  Stop Time: 1620  Time Calculation (min): 40 min    Insurance: [facility]  Visit # 2 of 30 for     Current Problem:  1. Rheumatoid arthritis with positive rheumatoid factor, involving unspecified site (Multi)        2. Pelvic floor weakness        3. Nocturia        4. Chronic midline low back pain with left-sided sciatica        5. Nocturnal enuresis        6. Muscle tightness        7. Muscle weakness              General     Precautions     Vital Signs     Pain       Subjective  Chief Complaint: back pain, L knee pain, urinary incontinence/nocturesis  - back pain, chronic, injection this year, no effects  - L knee pain   - urinary incontinence: wakes from the leaking/wetness, can only stop it for short periods, continues to keep leaking on her way to the bathroom  - sensation of urgency with intercourse - then goes to urinate and not much comes out  - unable to walk around grocery store at times due to pain, requires scooter, unable to stand up straight due to pain  Onset: years - worsening over the last year  WILDER: unknown    Today:   - has been training her bladder, urinating more during the day  - the past 1-1.5 weeks, noticing she is more regularly urinating during the day  - has only been waking up at night 1x  - has not had UI since last visit    PAIN  Intensity (0-10): 4  Location: central - L side/leg/hip  Description: sharp, aching    Aggravating Factors: walking, moving, equal weight bearing - posture  Relieving Factors:  heating pad, lumbar support/coccyx pillow,    Prior Level of Function (PLOF)  Exercise/Physical Activity: walking (30 -45 mins)  Work/School:  for [employer] ([employer])    Treatment Goals = Stop urinary incontinence, control her bladder, pain and leg pain    Cultural or Spiritual Practices:  Shinto  History of Trauma: bipolar (mental health support) - medication  Safe at Home: yes    OB/GYN HISTORY: Pregnancies (): 3   Births (Para): 0  Painful [place] or vaginal penetration? Pain historically & currently sense of urgency  BLADDER: too few daytime urination, nocturia, difficulty emptying,     Objective: PF holding/spasm, Back/Core/Hip/LE Weakness, Soft Tissue spasm/pain    Standing Assessment:   Posture: forward head, rounded shoulders, increased thoracic kyphosis  SL Stance: unable to tolerate on L or R  DL Squat: sit to stand: bi ue support, increased time and effort  Gait: decreased stance time and step length on L Leg, decreased knee flexion in stance, decreased hip extension bilaterally, compensated trendelenburg    Patient was educated on pelvic floor anatomy, function, and dysfunction.   Patient was educated on an orthopedic assessment & external pelvic floor assessment technique and verbalized consent.   The patient is aware they have full autonomy and can stop the assessment at any time.     Standing Assessment:   Standing Lumbar Mobility: mod extension limitation    Supine Movement Assessment   SLR: pelvic rocking, holding breath  Bridge: lumbar extension  Hip PROM: min limitation  MMT: sup hip: 3+/5 bilaterally    Supine Mobility Assessment  - Hamstrings: mild tightness  - Piriformis: mild tightness    Diaphragmatic Breathing: poor  Assess Abdomen  Iliopsoas: L painful/tightness  Transverse Abdominus Contraction: poor      Treatments: education  home exercise program, plan of care, activity modifications, pain management, and injury pathology  - bladder training: urinate every 2-4 hours throughout the day  - lumbosacral brace for walking/mobility    Access Code: MDEHGNEY  - Supine Pelvic Tilt  - 3-5 x weekly - 10 reps  - Supine Hip Adduction Isometric with Ball  - 3-5 x weekly - 3 sets - 10 reps  - Hooklying Clamshell with Resistance  - 3-5 x weekly - 3 sets - 10 reps  -  Bridge  - 3-5 x weekly - 3 sets - 10 reps    Plan for Next Visit:   - core/PF/hip strengthening    Assessment: Patient presents with signs and symptoms consistent with low back pain, leg pain, hip/knee pain, PF dysfunction, urinary incontinence, resulting in limited participation in pain-free ADLs and inability to perform at their prior level of function. Pt would benefit from physical therapy to address the impairments found & listed previously in the objective section in order to return to safe and pain-free ADLs and prior level of function.  - tolerated well without increased pain    Plan:   Planned Interventions include: therapeutic exercise, self-care home management, manual therapy, therapeutic activities, gait training, neuromuscular coordination, aquatic therapy  Patient and/or family understands and agrees with goals and plan documented: yes  Frequency: 2-4x/month  Duration: 2-6 months     Laurita Morrison, PT

## 2024-11-16 ENCOUNTER — ANTICOAGULATION - WARFARIN VISIT (OUTPATIENT)
Dept: CARDIOLOGY | Facility: CLINIC | Age: 49
End: 2024-11-16
Payer: COMMERCIAL

## 2024-11-16 ENCOUNTER — ANTICOAGULATION - WARFARIN VISIT (OUTPATIENT)
Dept: CARDIOLOGY | Facility: CLINIC | Age: 49
End: 2024-11-16

## 2024-11-16 DIAGNOSIS — I82.492 DEEP VEIN THROMBOSIS (DVT) OF OTHER VEIN OF LEFT LOWER EXTREMITY, UNSPECIFIED CHRONICITY (MULTI): ICD-10-CM

## 2024-11-16 DIAGNOSIS — I82.4Y2 ACUTE VENOUS EMBOLISM AND THROMBOSIS OF DEEP VESSELS OF PROXIMAL END OF LEFT LOWER EXTREMITY (MULTI): Primary | ICD-10-CM

## 2024-11-16 LAB
POC INR: 2.3
POC PROTHROMBIN TIME: NORMAL

## 2024-11-16 PROCEDURE — 85610 PROTHROMBIN TIME: CPT | Mod: QW

## 2024-11-16 PROCEDURE — 99211 OFF/OP EST MAY X REQ PHY/QHP: CPT

## 2024-11-18 ENCOUNTER — TREATMENT (OUTPATIENT)
Dept: PHYSICAL THERAPY | Facility: CLINIC | Age: 49
End: 2024-11-18
Payer: COMMERCIAL

## 2024-11-18 DIAGNOSIS — N39.44 NOCTURNAL ENURESIS: ICD-10-CM

## 2024-11-18 DIAGNOSIS — N81.89 PELVIC FLOOR WEAKNESS: ICD-10-CM

## 2024-11-18 DIAGNOSIS — M62.81 MUSCLE WEAKNESS: ICD-10-CM

## 2024-11-18 DIAGNOSIS — M05.9 RHEUMATOID ARTHRITIS WITH POSITIVE RHEUMATOID FACTOR, INVOLVING UNSPECIFIED SITE (MULTI): Primary | ICD-10-CM

## 2024-11-18 DIAGNOSIS — M62.89 MUSCLE TIGHTNESS: ICD-10-CM

## 2024-11-18 DIAGNOSIS — G89.29 CHRONIC MIDLINE LOW BACK PAIN WITH LEFT-SIDED SCIATICA: ICD-10-CM

## 2024-11-18 DIAGNOSIS — R35.1 NOCTURIA: ICD-10-CM

## 2024-11-18 DIAGNOSIS — M54.42 CHRONIC MIDLINE LOW BACK PAIN WITH LEFT-SIDED SCIATICA: ICD-10-CM

## 2024-11-18 PROCEDURE — 97110 THERAPEUTIC EXERCISES: CPT | Mod: GP | Performed by: PHYSICAL THERAPIST

## 2024-11-18 PROCEDURE — 97112 NEUROMUSCULAR REEDUCATION: CPT | Mod: GP | Performed by: PHYSICAL THERAPIST

## 2024-11-18 NOTE — PROGRESS NOTES
Physical Therapy  Physical Therapy Pelvic Floor    Name: Julianne Baxter  MRN: [id number]  : 1975  Encounter Date: 2024  Visit Count: 3     Time Calculation  Start Time: 1530  Stop Time: 1612  Time Calculation (min): 42 min    Insurance: [facility]  Visit # 3 of 30 for     Current Problem:  1. Rheumatoid arthritis with positive rheumatoid factor, involving unspecified site (Multi)        2. Pelvic floor weakness        3. Nocturia        4. Chronic midline low back pain with left-sided sciatica        5. Nocturnal enuresis        6. Muscle tightness        7. Muscle weakness                General     Precautions     Vital Signs     Pain       Subjective  Chief Complaint: back pain, L knee pain, urinary incontinence/nocturesis  - back pain, chronic, injection this year, no effects  - L knee pain   - urinary incontinence: wakes from the leaking/wetness, can only stop it for short periods, continues to keep leaking on her way to the bathroom  - sensation of urgency with intercourse - then goes to urinate and not much comes out  - unable to walk around grocery store at times due to pain, requires scooter, unable to stand up straight due to pain  Onset: years - worsening over the last year  WILDER: unknown    Today:   - L sided lower back/groin/leg pain continued  - no episodes of leaking  - continued bladder training  - slowed her liquid intake    PAIN  Intensity (0-10): 4  Location: central - L side/leg/hip  Description: sharp, aching    Aggravating Factors: walking, moving, equal weight bearing - posture  Relieving Factors:  heating pad, lumbar support/coccyx pillow,    Prior Level of Function (PLOF)  Exercise/Physical Activity: walking (30 -45 mins)  Work/School:  for [employer] ([employer])    Treatment Goals = Stop urinary incontinence, control her bladder, pain and leg pain    Cultural or Spiritual Practices: Uatsdin  History of Trauma: bipolar (mental health support) -  medication  Safe at Home: yes    OB/GYN HISTORY: Pregnancies (): 3   Births (Para): 0  Painful [place] or vaginal penetration? Pain historically & currently sense of urgency  BLADDER: too few daytime urination, nocturia, difficulty emptying,     Objective: PF holding/spasm, Back/Core/Hip/LE Weakness, Soft Tissue spasm/pain    Standing Assessment:   Posture: forward head, rounded shoulders, increased thoracic kyphosis  SL Stance: unable to tolerate on L or R  DL Squat: sit to stand: bi ue support, increased time and effort  Gait: decreased stance time and step length on L Leg, decreased knee flexion in stance, decreased hip extension bilaterally, compensated trendelenburg  Standing Lumbar Mobility: mod extension limitation    Supine Movement Assessment   SLR: pelvic rocking, holding breath  Bridge: lumbar extension  Hip PROM: min limitation  MMT: sup hip: 3+/5 bilaterally    Supine Mobility Assessment  - Hamstrings: mild tightness  - Piriformis: mild tightness    Diaphragmatic Breathing: poor  Assess Abdomen  Iliopsoas: L painful/tightness  Transverse Abdominus Contraction: poor      Treatments:  home exercise program, plan of care, activity modifications, pain management, and injury pathology  - bladder training: urinate every 2-4 hours throughout the day  - lumbosacral brace for walking/mobility - doesn't have it yet    NuStep 8min lvl 1  SAQ 3:30sec    Access Code: MDEHGNEY  - Supine Pelvic Tilt  - 3-5 x weekly - 10 reps  - Supine Hip Adduction Isometric with Ball  - 3-5 x weekly - 3 sets - 10 reps  - Hooklying Clamshell with Resistance  - 3-5 x weekly - 3 sets - 10 reps  - Bridge  - 3-5 x weekly - 3 sets - 10 reps    Plan for Next Visit:   - core/PF/hip strengthening    Assessment: Patient presents with signs and symptoms consistent with low back pain, leg pain, hip/knee pain, PF dysfunction, urinary incontinence, resulting in limited participation in pain-free ADLs and inability to perform at their  prior level of function. Pt would benefit from physical therapy to address the impairments found & listed previously in the objective section in order to return to safe and pain-free ADLs and prior level of function.  - tolerated well without increased pain    Plan:   Planned Interventions include: therapeutic exercise, self-care home management, manual therapy, therapeutic activities, gait training, neuromuscular coordination, aquatic therapy  Patient and/or family understands and agrees with goals and plan documented: yes  Frequency: 2-4x/month  Duration: 2-6 months     Laurita Morrison, PT

## 2024-11-19 DIAGNOSIS — M05.9 RHEUMATOID ARTHRITIS WITH POSITIVE RHEUMATOID FACTOR, INVOLVING UNSPECIFIED SITE (MULTI): ICD-10-CM

## 2024-11-25 ENCOUNTER — TREATMENT (OUTPATIENT)
Dept: PHYSICAL THERAPY | Facility: CLINIC | Age: 49
End: 2024-11-25
Payer: COMMERCIAL

## 2024-11-25 DIAGNOSIS — M62.81 MUSCLE WEAKNESS: ICD-10-CM

## 2024-11-25 DIAGNOSIS — M54.42 CHRONIC MIDLINE LOW BACK PAIN WITH LEFT-SIDED SCIATICA: ICD-10-CM

## 2024-11-25 DIAGNOSIS — N39.44 NOCTURNAL ENURESIS: ICD-10-CM

## 2024-11-25 DIAGNOSIS — N81.89 PELVIC FLOOR WEAKNESS: ICD-10-CM

## 2024-11-25 DIAGNOSIS — R35.1 NOCTURIA: ICD-10-CM

## 2024-11-25 DIAGNOSIS — G89.29 CHRONIC MIDLINE LOW BACK PAIN WITH LEFT-SIDED SCIATICA: ICD-10-CM

## 2024-11-25 DIAGNOSIS — M62.89 MUSCLE TIGHTNESS: ICD-10-CM

## 2024-11-25 DIAGNOSIS — M05.9 RHEUMATOID ARTHRITIS WITH POSITIVE RHEUMATOID FACTOR, INVOLVING UNSPECIFIED SITE (MULTI): Primary | ICD-10-CM

## 2024-11-25 PROCEDURE — 97110 THERAPEUTIC EXERCISES: CPT | Mod: GP | Performed by: PHYSICAL THERAPIST

## 2024-11-25 NOTE — PROGRESS NOTES
Physical Therapy  Physical Therapy Pelvic Floor    Name: Julianne Baxter  MRN: [id number]  : 1975  Encounter Date: 2024  Visit Count: 4     Time Calculation  Start Time: 830  Stop Time: 925  Time Calculation (min): 55 min    Insurance: [facility]  Visit # 4 of 30 for     Current Problem:  1. Rheumatoid arthritis with positive rheumatoid factor, involving unspecified site (Multi)        2. Pelvic floor weakness        3. Nocturia        4. Chronic midline low back pain with left-sided sciatica        5. Nocturnal enuresis        6. Muscle tightness        7. Muscle weakness            General     Precautions     Vital Signs     Pain       Subjective  Chief Complaint: back pain, L knee pain, urinary incontinence/nocturesis  - back pain, chronic, injection this year, no effects  - L knee pain   - urinary incontinence: wakes from the leaking/wetness, can only stop it for short periods, continues to keep leaking on her way to the bathroom  - sensation of urgency with intercourse - then goes to urinate and not much comes out  - unable to walk around grocery store at times due to pain, requires scooter, unable to stand up straight due to pain  Onset: years - worsening over the last year  WILDER: unknown    Today:   - L sided lateral hip pain worse over the weekend, didn't leave the house and relatively immobile due to pain  - no episodes of leaking  - continued bladder training    PAIN  Intensity (0-10): 8-9/10   Location: central - L side/leg/hip  Description: sharp, aching    Aggravating Factors: walking, moving, equal weight bearing - posture  Relieving Factors:  heating pad, lumbar support/coccyx pillow,    Prior Level of Function (PLOF)  Exercise/Physical Activity: walking (30 -45 mins)  Work/School:  for [employer] ([employer])    Treatment Goals = Stop urinary incontinence, control her bladder, pain and leg pain    Cultural or Spiritual Practices: [employer]  History of Trauma: bipolar  (mental health support) - medication  Safe at Home: yes    OB/GYN HISTORY: Pregnancies (): 3   Births (Para): 0  Painful [place] or vaginal penetration? Pain historically & currently sense of urgency  BLADDER: too few daytime urination, nocturia, difficulty emptying,     Objective: PF holding/spasm, Back/Core/Hip/LE Weakness, Soft Tissue spasm/pain    Standing Assessment:   Posture: forward head, rounded shoulders, increased thoracic kyphosis  SL Stance: unable to tolerate on L or R  DL Squat: sit to stand: bi ue support, increased time and effort  Gait: decreased stance time and step length on L Leg, decreased knee flexion in stance, decreased hip extension bilaterally, compensated trendelenburg  Standing Lumbar Mobility: mod extension limitation    Supine Movement Assessment   SLR: pelvic rocking, holding breath  Bridge: lumbar extension  Hip PROM: min limitation  MMT: sup hip: 3+/5 bilaterally    Supine Mobility Assessment  - Hamstrings: mild tightness  - Piriformis: mild tightness    Diaphragmatic Breathing: poor  Assess Abdomen  Iliopsoas: L painful/tightness  Transverse Abdominus Contraction: poor      Treatments:  home exercise program, plan of care, activity modifications, pain management, and injury pathology  - bladder training: urinate every 2-4 hours throughout the day  - lumbosacral brace for walking/mobility - doesn't have it yet    NuStep 9min lvl 1  LAQ 4:30sec  Supine Lumbar Rotation 3min    Access Code: MDEHGNEY  - Supine Pelvic Tilt  - 3-5 x weekly - 10 reps   *red band iso abd, PPT hold 3 sec  - Supine Hip Adduction Isometric with Ball  - 3-5 x weekly - 3 sets - 10 reps   * 10sec holds  - Hooklying Clamshell with Resistance  - 3-5 x weekly - 3 sets - 10 reps   *red band, 10sec hold - repeat  - Bridge  - 3-5 x weekly - 3 sets - 10 reps    Plan for Next Visit:   - core/PF/hip strengthening - move to standing/functional strength for hep    Assessment: Patient presents with signs and  symptoms consistent with low back pain, leg pain, hip/knee pain, PF dysfunction, urinary incontinence, resulting in limited participation in pain-free ADLs and inability to perform at their prior level of function. Pt would benefit from physical therapy to address the impairments found & listed previously in the objective section in order to return to safe and pain-free ADLs and prior level of function.  - tolerated well without increased pain    Plan:   Planned Interventions include: therapeutic exercise, self-care home management, manual therapy, therapeutic activities, gait training, neuromuscular coordination, aquatic therapy  Patient and/or family understands and agrees with goals and plan documented: yes  Frequency: 2-4x/month  Duration: 2-6 months     Laurita Morrison, PT

## 2024-12-09 ENCOUNTER — TREATMENT (OUTPATIENT)
Dept: PHYSICAL THERAPY | Facility: CLINIC | Age: 49
End: 2024-12-09
Payer: COMMERCIAL

## 2024-12-09 DIAGNOSIS — M05.9 RHEUMATOID ARTHRITIS WITH POSITIVE RHEUMATOID FACTOR, INVOLVING UNSPECIFIED SITE (MULTI): Primary | ICD-10-CM

## 2024-12-09 DIAGNOSIS — G89.29 CHRONIC MIDLINE LOW BACK PAIN WITH LEFT-SIDED SCIATICA: ICD-10-CM

## 2024-12-09 DIAGNOSIS — M62.81 MUSCLE WEAKNESS: ICD-10-CM

## 2024-12-09 DIAGNOSIS — R35.1 NOCTURIA: ICD-10-CM

## 2024-12-09 DIAGNOSIS — M54.42 CHRONIC MIDLINE LOW BACK PAIN WITH LEFT-SIDED SCIATICA: ICD-10-CM

## 2024-12-09 DIAGNOSIS — N81.89 PELVIC FLOOR WEAKNESS: ICD-10-CM

## 2024-12-09 DIAGNOSIS — N39.44 NOCTURNAL ENURESIS: ICD-10-CM

## 2024-12-09 DIAGNOSIS — M62.89 MUSCLE TIGHTNESS: ICD-10-CM

## 2024-12-09 PROCEDURE — 97112 NEUROMUSCULAR REEDUCATION: CPT | Mod: GP | Performed by: PHYSICAL THERAPIST

## 2024-12-09 PROCEDURE — 97110 THERAPEUTIC EXERCISES: CPT | Mod: GP | Performed by: PHYSICAL THERAPIST

## 2024-12-09 NOTE — PROGRESS NOTES
Physical Therapy  Physical Therapy Pelvic Floor    Name: Julianne Baxter  MRN: [id number]  : 1975  Encounter Date: 2024  Visit Count: 5     Time Calculation  Start Time: 1530  Stop Time: 1625  Time Calculation (min): 55 min    Insurance: [facility]  Visit # 5 of 30 for     Current Problem:  1. Rheumatoid arthritis with positive rheumatoid factor, involving unspecified site (Multi)        2. Pelvic floor weakness        3. Nocturia        4. Chronic midline low back pain with left-sided sciatica        5. Nocturnal enuresis        6. Muscle tightness        7. Muscle weakness              General     Precautions     Vital Signs     Pain       Subjective  Chief Complaint: back pain, L knee pain, urinary incontinence/nocturesis  - back pain, chronic, injection this year, no effects  - L knee pain   - urinary incontinence: wakes from the leaking/wetness, can only stop it for short periods, continues to keep leaking on her way to the bathroom  - sensation of urgency with intercourse - then goes to urinate and not much comes out  - unable to walk around grocery store at times due to pain, requires scooter, unable to stand up straight due to pain  Onset: years - worsening over the last year  WILDER: unknown    Today:   - L sided pain still there, throbbing, less in the knee, more in the hip, better with pressure on that side in sitting or standing  - no episodes of leaking, stops drinking at 8am  - continued bladder training  - hep compliance: yes    PAIN  Intensity (0-10): 5/10   Location: central - L side/leg/hip  Description: sharp, aching    Aggravating Factors: walking, moving, equal weight bearing - posture  Relieving Factors:  heating pad, lumbar support/coccyx pillow,    Prior Level of Function (PLOF)  Exercise/Physical Activity: walking (30 -45 mins)  Work/School:  for [employer]    Treatment Goals = Stop urinary incontinence, control her bladder, pain and leg pain    Cultural or  Spiritual Practices: Jainism  History of Trauma: bipolar (mental health support) - medication  Safe at Home: yes    OB/GYN HISTORY: Pregnancies (): 3   Births (Para): 0  Painful [place] or vaginal penetration? Pain historically & currently sense of urgency  BLADDER: too few daytime urination, nocturia, difficulty emptying,     Objective: PF holding/spasm, Back/Core/Hip/LE Weakness, Soft Tissue spasm/pain    Standing Assessment:   Posture: forward head, rounded shoulders, increased thoracic kyphosis  SL Stance: unable to tolerate on L or R  DL Squat: sit to stand: bi ue support, increased time and effort  Gait: decreased stance time and step length on L Leg, decreased knee flexion in stance, decreased hip extension bilaterally, compensated trendelenburg  Standing Lumbar Mobility: mod extension limitation  Prone Lyin/10 in lower back, same throbbing in front hip area (L2/3 dermatome)  Prone Lying + Pillow under Hips: 3/10 in lower back, less throbbing in front hip area    Supine Movement Assessment   SLR: pelvic rocking, holding breath  Bridge: lumbar extension  Hip PROM: min limitation  MMT: sup hip: 3+/5 bilaterally    Supine Mobility Assessment  - Hamstrings: mild tightness  - Piriformis: mild tightness    Diaphragmatic Breathing: poor  Assess Abdomen  Iliopsoas: L painful/tightness  Transverse Abdominus Contraction: poor      Treatments:  home exercise program, plan of care, activity modifications, pain management, and injury pathology  - bladder training: urinate every 2-4 hours throughout the day  - lumbosacral brace for walking/mobility - doesn't have it yet    NuStep 9min lvl 1  LAQ 4:30sec  Supine Lumbar Rotation 3min    Access Code: [email or web address]  - Supine Pelvic Tilt  - 3-5 x weekly - 10 reps   *red band iso abd, PPT hold 3 sec  - Supine Hip Adduction Isometric with Ball  - 3-5 x weekly - 3 sets - 10 reps   * 10sec holds  - Hooklying Clamshell with Resistance  - 3-5 x weekly - 3 sets -  10 reps   *red band, 10sec hold - repeat  - Bridge  - 3-5 x weekly - 3 sets - 10 reps    - Standing Gluteal Sets  - 3-5 x weekly - 3 sets - 10 reps  - Heel Toe Raises with Counter Support  - 3-5 x weekly - 3 sets - 10 reps  - Mini Squat with Counter Support  - 3-5 x weekly - 3 sets - 5-10 reps  - Standing Hip Abduction with Counter Support  - 3-5 x weekly - 3 sets - 5-10 reps  - Seated Flexion Stretch with Swiss Ball  - 1 x daily - 10 reps  - Prone Knee Flexion Extension AROM  - 1 x daily - 10 reps  - Lying Prone with 2 Pillows  - 1 x daily - 5-10 minutes hold    Plan for Next Visit:   - core/PF/hip strengthening - move to standing/functional strength for hep    Assessment: Patient presents with signs and symptoms consistent with low back pain, leg pain, hip/knee pain, PF dysfunction, urinary incontinence, resulting in limited participation in pain-free ADLs and inability to perform at their prior level of function. Pt would benefit from physical therapy to address the impairments found & listed previously in the objective section in order to return to safe and pain-free ADLs and prior level of function.  - tolerated well without increased pain    Plan:   Planned Interventions include: therapeutic exercise, self-care home management, manual therapy, therapeutic activities, gait training, neuromuscular coordination, aquatic therapy  Patient and/or family understands and agrees with goals and plan documented: yes  Frequency: 2-4x/month  Duration: 2-6 months     Laurita Morrison, PT

## 2024-12-14 ENCOUNTER — ANTICOAGULATION - WARFARIN VISIT (OUTPATIENT)
Dept: CARDIOLOGY | Facility: CLINIC | Age: 49
End: 2024-12-14
Payer: COMMERCIAL

## 2024-12-14 DIAGNOSIS — I82.4Y2 ACUTE VENOUS EMBOLISM AND THROMBOSIS OF DEEP VESSELS OF PROXIMAL END OF LEFT LOWER EXTREMITY (MULTI): Primary | ICD-10-CM

## 2024-12-14 DIAGNOSIS — I82.492 DEEP VEIN THROMBOSIS (DVT) OF OTHER VEIN OF LEFT LOWER EXTREMITY, UNSPECIFIED CHRONICITY (MULTI): ICD-10-CM

## 2024-12-14 LAB
POC INR: 2.1
POC PROTHROMBIN TIME: NORMAL

## 2024-12-14 PROCEDURE — 85610 PROTHROMBIN TIME: CPT | Mod: QW

## 2024-12-14 PROCEDURE — 99211 OFF/OP EST MAY X REQ PHY/QHP: CPT

## 2024-12-14 NOTE — PROGRESS NOTES
Patient identification verified with 2 identifiers.    Location: [facility] at [facility] - [address] [address] [phone] option #1     Referring Physician: Matheus Oro MD   Enrollment/ Re-enrollment date: 25  INR Goal: 2.0-3.0  INR monitoring is per [facility]MS protocol.  Anticoagulation Medication: warfarin  Indication: Deep Vein Thrombosis (DVT)    Subjective   Bleeding signs/symptoms: No    Bruising: No   Major bleeding event: No  Thrombosis signs/symptoms: No  Thromboembolic event: No  Missed doses: No  Extra doses: No  Medication changes: No  Dietary changes: No  Change in health: No  Change in activity: No  Alcohol: No  Other concerns: No    Upcoming Procedures:  Does the Patient Have any upcoming procedures that require interruption in anticoagulation therapy? no  Does the patient require bridging? no      Anticoagulation Summary  As of 2024      INR goal:  2.0-3.0   TTR:  77.2% (1.2 y)   INR used for dosin.10 (2024)   Weekly warfarin total:  47.5 mg               Assessment/Plan   Therapeutic     1. New dose: no change    2. Next INR: 1 month      Education provided to patient during the visit:  Patient instructed to call in interim with questions, concerns and changes.

## 2024-12-24 DIAGNOSIS — N76.0 BACTERIAL VAGINOSIS: ICD-10-CM

## 2024-12-24 DIAGNOSIS — B96.89 BACTERIAL VAGINOSIS: ICD-10-CM

## 2024-12-26 RX ORDER — METRONIDAZOLE 500 MG/1
500 TABLET ORAL 2 TIMES DAILY
Qty: 14 TABLET | Refills: 1 | Status: SHIPPED | OUTPATIENT
Start: 2024-12-26 | End: 2025-01-02

## 2025-01-06 ENCOUNTER — APPOINTMENT (OUTPATIENT)
Dept: PHYSICAL THERAPY | Facility: CLINIC | Age: 50
End: 2025-01-06
Payer: COMMERCIAL

## 2025-01-10 ENCOUNTER — TREATMENT (OUTPATIENT)
Dept: PHYSICAL THERAPY | Facility: CLINIC | Age: 50
End: 2025-01-10
Payer: COMMERCIAL

## 2025-01-10 DIAGNOSIS — M62.89 MUSCLE TIGHTNESS: ICD-10-CM

## 2025-01-10 DIAGNOSIS — N39.44 NOCTURNAL ENURESIS: ICD-10-CM

## 2025-01-10 DIAGNOSIS — M05.9 RHEUMATOID ARTHRITIS WITH POSITIVE RHEUMATOID FACTOR, INVOLVING UNSPECIFIED SITE (MULTI): Primary | ICD-10-CM

## 2025-01-10 DIAGNOSIS — N81.89 PELVIC FLOOR WEAKNESS: ICD-10-CM

## 2025-01-10 DIAGNOSIS — R35.1 NOCTURIA: ICD-10-CM

## 2025-01-10 DIAGNOSIS — M54.42 CHRONIC MIDLINE LOW BACK PAIN WITH LEFT-SIDED SCIATICA: ICD-10-CM

## 2025-01-10 DIAGNOSIS — G89.29 CHRONIC MIDLINE LOW BACK PAIN WITH LEFT-SIDED SCIATICA: ICD-10-CM

## 2025-01-10 DIAGNOSIS — M62.81 MUSCLE WEAKNESS: ICD-10-CM

## 2025-01-10 PROCEDURE — 97110 THERAPEUTIC EXERCISES: CPT | Mod: GP | Performed by: PHYSICAL THERAPIST

## 2025-01-10 NOTE — PROGRESS NOTES
Physical Therapy  Physical Therapy Pelvic Floor Discharge    Name: Julianne Baxter  MRN: [id number]  : 1975  Encounter Date: 1/10/2025  Visit Count: 6     Time Calculation  Start Time: 0400  Stop Time: 0  Time Calculation (min): 40 min    Insurance: [facility] - new insurance in   Visit # 1 of ??? For  (5 used in )    Current Problem:  1. Rheumatoid arthritis with positive rheumatoid factor, involving unspecified site (Multi)        2. Pelvic floor weakness        3. Nocturia        4. Chronic midline low back pain with left-sided sciatica        5. Nocturnal enuresis        6. Muscle tightness        7. Muscle weakness            General     Precautions     Vital Signs     Pain       Subjective  Chief Complaint: back pain, L knee pain, urinary incontinence/nocturesis  - back pain, chronic, injection this year, no effects  - L knee pain   - urinary incontinence: wakes from the leaking/wetness, can only stop it for short periods, continues to keep leaking on her way to the bathroom  - sensation of urgency with intercourse - then goes to urinate and not much comes out  - unable to walk around grocery store at times due to pain, requires scooter, unable to stand up straight due to pain  Onset: years - worsening over the last year  WILDER: unknown    24:   - L sided pain still there, throbbing, less in the knee, more in the hip, better with pressure on that side in sitting or standing  - no episodes of leaking, stops drinking at 8am  - continued bladder training  - hep compliance: yes    1/10/25:   - only 1 incontinence episode (sleep deprivation) due to work & increased coffee intake  - hep compliance: yes, moderate  - less back/L hip pain when lying on R side and deep lumbar flexion  - fell on her R side 2 weeks ago (reports no bruising)    PAIN  Intensity (0-10): 5/10   Location: central - L side/leg/hip  Description: sharp, aching    Aggravating Factors: walking, moving, equal weight bearing -  posture  Relieving Factors:  heating pad, lumbar support/coccyx pillow,    Prior Level of Function (PLOF)  Exercise/Physical Activity: walking (30 -45 mins)  Work/School:  for [employer]    Treatment Goals = Stop urinary incontinence, control her bladder, pain and leg pain    Cultural or Spiritual Practices: Islam  History of Trauma: bipolar (mental health support) - medication  Safe at Home: yes    OB/GYN HISTORY: Pregnancies (): 3   Births (Para): 0  Painful [place] or vaginal penetration? Pain historically & currently sense of urgency  BLADDER: too few daytime urination, nocturia, difficulty emptying,     Objective: PF holding/spasm, Back/Core/Hip/LE Weakness, Soft Tissue spasm/pain    Standing Assessment:   Posture: forward head, rounded shoulders, increased thoracic kyphosis  SL Stance: unable to tolerate on L or R  DL Squat: sit to stand: bi ue support, increased time and effort  Gait: decreased stance time and step length on L Leg, decreased knee flexion in stance, decreased hip extension bilaterally, compensated trendelenburg  Standing Lumbar Mobility: mod extension limitation  Prone Lyin/10 in lower back, same throbbing in front hip area (L2/3 dermatome)  Prone Lying + Pillow under Hips: 3/10 in lower back, less throbbing in front hip area    Supine Movement Assessment   SLR: pelvic rocking, holding breath  Bridge: lumbar extension  Hip PROM: min limitation  MMT: sup hip: 3+/5 bilaterally    Supine Mobility Assessment  - Hamstrings: mild tightness  - Piriformis: mild tightness    Diaphragmatic Breathing: poor  Assess Abdomen  Iliopsoas: L painful/tightness  Transverse Abdominus Contraction: poor    Outcome Measures: Updated 1/10/2025  NIH-CPSI (10/10/24 - 1/10/25)  -Pain: 0  - Urinary Symp: 4-3  - QOL: 10 - 2    EDUCATION: home exercise program, plan of care, activity modifications, pain management, and injury pathology       Goals: Updated 1/10/2025  Active        PT Problem       PT Goal 1       Start:  10/10/24    Expected End:  01/08/25       Patient will return to prior level of function with minimal to no pain and without limitations for participation in ADLs, health, and exercise.   Patient demonstrate home exercise program adherence to supplement symptom reduction and functional gains made in clinic  Patient will reports minimal to no pain for participation in ADLs and return to PLOF.   Patient will demonstrate coordination of pelvic floor, strength & relaxation wnl for return to ADLs and PLOF without restriction.   Pt will demonstrate improvement on the outcome measure score to demonstrate overall improved functional abilities and quality of life.              Treatments:  home exercise program, plan of care, activity modifications, pain management, and injury pathology  - bladder training: urinate every 2-4 hours throughout the day  - lumbosacral brace for walking/mobility - doesn't have it yet    Access Code: [email or web address]  - Supine Pelvic Tilt  - 3-5 x weekly - 10 reps  - Supine Hip Adduction Isometric with Ball  - 3-5 x weekly - 3 sets - 10 reps   * 10sec holds  - Hooklying Clamshell with Resistance  - 3-5 x weekly - 3 sets - 10 reps   *red band, 10sec hold - repeat  - Bridge  - 3-5 x weekly - 3 sets - 10 reps    - Standing Gluteal Sets  - 3-5 x weekly - 3 sets - 10 reps  - Heel Toe Raises with Counter Support  - 3-5 x weekly - 3 sets - 10 reps  - Mini Squat with Counter Support  - 3-5 x weekly - 3 sets - 5-10 reps  - Standing Hip Abduction with Counter Support  - 3-5 x weekly - 3 sets - 5-10 reps  - Seated Flexion Stretch with Swiss Ball  - 1 x daily - 10 reps  - Prone Knee Flexion Extension AROM  - 1 x daily - 10 reps  - Lying Prone with 2 Pillows  - 1 x daily - 5-10 minutes hold    Assessment: Patient's pelvic floor symptoms have mostly resolved with hep compliance and fluid management  Patient continues to have back and hip pain, I recommend FU with  rheumatologist and new referral for aquatic therapy for back/hip pain    Plan: ASHWINI Morrison, PT

## 2025-01-11 ENCOUNTER — ANTICOAGULATION - WARFARIN VISIT (OUTPATIENT)
Dept: CARDIOLOGY | Facility: CLINIC | Age: 50
End: 2025-01-11
Payer: COMMERCIAL

## 2025-01-11 DIAGNOSIS — I82.492 DEEP VEIN THROMBOSIS (DVT) OF OTHER VEIN OF LEFT LOWER EXTREMITY, UNSPECIFIED CHRONICITY (MULTI): ICD-10-CM

## 2025-01-11 DIAGNOSIS — I82.4Y2 ACUTE VENOUS EMBOLISM AND THROMBOSIS OF DEEP VESSELS OF PROXIMAL END OF LEFT LOWER EXTREMITY (MULTI): Primary | ICD-10-CM

## 2025-01-11 LAB
POC INR: 1.9
POC PROTHROMBIN TIME: NORMAL

## 2025-01-11 PROCEDURE — 99211 OFF/OP EST MAY X REQ PHY/QHP: CPT

## 2025-01-11 PROCEDURE — 85610 PROTHROMBIN TIME: CPT | Mod: QW

## 2025-01-11 NOTE — PROGRESS NOTES
Patient identification verified with 2 identifiers.    Location: [facility] at [facility] - [address] [address] [phone] option #1     Referring Physician: Matheus Oro MD   Enrollment/ Re-enrollment date: 25  INR Goal: 2.0-3.0  INR monitoring is per AMS protocol.  Anticoagulation Medication: warfarin  Indication: Deep Vein Thrombosis (DVT)    Subjective   Bleeding signs/symptoms: No    Bruising: No   Major bleeding event: No  Thrombosis signs/symptoms: No  Thromboembolic event: No  Missed doses: No  Extra doses: No  Medication changes: No  Dietary changes: Yes  increased consumption of green vegetables  Change in health: No  Change in activity: No  Alcohol: No  Other concerns: No    Upcoming Procedures:  Does the Patient Have any upcoming procedures that require interruption in anticoagulation therapy? no  Does the patient require bridging? no      Anticoagulation Summary  As of 2025      INR goal:  2.0-3.0   TTR:  75.6% (1.3 y)   INR used for dosin.90 (2025)   Weekly warfarin total:  47.5 mg               Assessment/Plan   Sub therapeutic in setting of significantly increased green vegetable intake that pt does not plan to continue with.  Maintain TWD  RTC in 2 weeks per pt availability     Education provided to patient during the visit:  Patient instructed to call in interim with questions, concerns and changes.

## 2025-01-20 ENCOUNTER — APPOINTMENT (OUTPATIENT)
Dept: PHYSICAL THERAPY | Facility: CLINIC | Age: 50
End: 2025-01-20
Payer: COMMERCIAL

## 2025-01-25 ENCOUNTER — ANTICOAGULATION - WARFARIN VISIT (OUTPATIENT)
Dept: CARDIOLOGY | Facility: CLINIC | Age: 50
End: 2025-01-25
Payer: COMMERCIAL

## 2025-01-25 DIAGNOSIS — I82.4Y2 ACUTE VENOUS EMBOLISM AND THROMBOSIS OF DEEP VESSELS OF PROXIMAL END OF LEFT LOWER EXTREMITY (MULTI): Primary | ICD-10-CM

## 2025-01-25 DIAGNOSIS — I82.492 DEEP VEIN THROMBOSIS (DVT) OF OTHER VEIN OF LEFT LOWER EXTREMITY, UNSPECIFIED CHRONICITY (MULTI): ICD-10-CM

## 2025-01-25 LAB
POC INR: 1.6
POC PROTHROMBIN TIME: NORMAL

## 2025-01-25 PROCEDURE — 99211 OFF/OP EST MAY X REQ PHY/QHP: CPT

## 2025-01-25 PROCEDURE — 85610 PROTHROMBIN TIME: CPT | Mod: QW

## 2025-01-25 NOTE — PROGRESS NOTES
Patient identification verified with 2 identifiers.    Location: [facility] at [facility] - [address] [address] [phone] option #1     Referring Physician: LALY  Enrollment/ Re-enrollment date: 25   INR Goal: 2.0-3.0  INR monitoring is per [facility] protocol.  Anticoagulation Medication: warfarin  Indication: DVT    Subjective   Bleeding signs/symptoms: No    Bruising: No   Major bleeding event: No  Thrombosis signs/symptoms: No  Thromboembolic event: No  Missed doses: No  Extra doses: No  Medication changes: No  Dietary changes: No  Change in health: No  Change in activity: No  Alcohol: No  Other concerns: No    Upcoming Procedures:  Does the Patient Have any upcoming procedures that require interruption in anticoagulation therapy? no  Does the patient require bridging? no      Anticoagulation Summary  As of 2025      INR goal:  2.0-3.0   TTR:  73.4% (1.3 y)   INR used for dosin.60 (2025)   Weekly warfarin total:  50 mg               Assessment/Plan   Subtherapeutic     1. New dose:  INCREASE IN DOSING     2. Next INR: 2 weeks - UNABLE TO RETURN IN 1 WEEK      Education provided to patient during the visit:  Patient instructed to call in interim with questions, concerns and changes.   Patient educated on interactions between medications and warfarin.   Patient educated on dietary consistency in vitamin k consumption.   Patient educated on affects of alcohol consumption while taking warfarin.   Patient educated on signs of bleeding/clotting.   Patient educated on compliance with dosing, follow up appointments, and prescribed plan of care.

## 2025-02-07 ENCOUNTER — ANTICOAGULATION - WARFARIN VISIT (OUTPATIENT)
Dept: CARDIOLOGY | Facility: CLINIC | Age: 50
End: 2025-02-07
Payer: COMMERCIAL

## 2025-02-07 DIAGNOSIS — I82.492 DEEP VEIN THROMBOSIS (DVT) OF OTHER VEIN OF LEFT LOWER EXTREMITY, UNSPECIFIED CHRONICITY (MULTI): ICD-10-CM

## 2025-02-07 DIAGNOSIS — I82.4Y2 ACUTE VENOUS EMBOLISM AND THROMBOSIS OF DEEP VESSELS OF PROXIMAL END OF LEFT LOWER EXTREMITY (MULTI): Primary | ICD-10-CM

## 2025-02-08 ENCOUNTER — APPOINTMENT (OUTPATIENT)
Dept: CARDIOLOGY | Facility: CLINIC | Age: 50
End: 2025-02-08
Payer: COMMERCIAL

## 2025-02-14 ENCOUNTER — ANTICOAGULATION - WARFARIN VISIT (OUTPATIENT)
Dept: CARDIOLOGY | Facility: CLINIC | Age: 50
End: 2025-02-14
Payer: COMMERCIAL

## 2025-02-14 DIAGNOSIS — I82.4Y2 ACUTE VENOUS EMBOLISM AND THROMBOSIS OF DEEP VESSELS OF PROXIMAL END OF LEFT LOWER EXTREMITY (MULTI): Primary | ICD-10-CM

## 2025-02-14 DIAGNOSIS — I82.492 DEEP VEIN THROMBOSIS (DVT) OF OTHER VEIN OF LEFT LOWER EXTREMITY, UNSPECIFIED CHRONICITY (MULTI): ICD-10-CM

## 2025-02-14 LAB
POC INR: 2
POC PROTHROMBIN TIME: NORMAL

## 2025-02-14 PROCEDURE — 99211 OFF/OP EST MAY X REQ PHY/QHP: CPT

## 2025-02-14 PROCEDURE — 85610 PROTHROMBIN TIME: CPT | Mod: QW

## 2025-02-14 NOTE — PROGRESS NOTES
Patient identification verified with 2 identifiers.    Location: [facility] at [facility] - [address] [address] [phone] option #1     Referring Physician: LALY  Enrollment/ Re-enrollment date: 25   INR Goal: 2.0-3.0  INR monitoring is per [facility] protocol.  Anticoagulation Medication: warfarin  Indication: DVT    Subjective   Bleeding signs/symptoms: No    Bruising: No   Major bleeding event: No  Thrombosis signs/symptoms: No  Thromboembolic event: No  Missed doses: Yes  missed doses  Extra doses: No  Medication changes: No  Dietary changes: No  Change in health: No  Change in activity: No  Alcohol: No  Other concerns: No    Upcoming Procedures:  Does the Patient Have any upcoming procedures that require interruption in anticoagulation therapy? no  Does the patient require bridging? no      Anticoagulation Summary  As of 2025      INR goal:  2.0-3.0   TTR:  70.5% (1.4 y)   INR used for dosin.00 (2025)   Weekly warfarin total:  50 mg               Assessment/Plan   Therapeutic  Maintain TWD  RTC in 8 days pt requests Saturday appointment.      Education provided to patient during the visit:  Patient instructed to call in interim with questions, concerns and changes.   Patient educated on interactions between medications and warfarin.   Patient educated on dietary consistency in vitamin k consumption.   Patient educated on affects of alcohol consumption while taking warfarin.   Patient educated on signs of bleeding/clotting.   Patient educated on compliance with dosing, follow up appointments, and prescribed plan of care.

## 2025-02-22 ENCOUNTER — ANTICOAGULATION - WARFARIN VISIT (OUTPATIENT)
Dept: CARDIOLOGY | Facility: CLINIC | Age: 50
End: 2025-02-22
Payer: COMMERCIAL

## 2025-02-22 DIAGNOSIS — I82.492 DEEP VEIN THROMBOSIS (DVT) OF OTHER VEIN OF LEFT LOWER EXTREMITY, UNSPECIFIED CHRONICITY (MULTI): ICD-10-CM

## 2025-02-22 DIAGNOSIS — I82.4Y2 ACUTE VENOUS EMBOLISM AND THROMBOSIS OF DEEP VESSELS OF PROXIMAL END OF LEFT LOWER EXTREMITY (MULTI): Primary | ICD-10-CM

## 2025-02-22 LAB
POC INR: 2.1
POC PROTHROMBIN TIME: NORMAL

## 2025-02-22 PROCEDURE — 99211 OFF/OP EST MAY X REQ PHY/QHP: CPT

## 2025-02-22 PROCEDURE — 85610 PROTHROMBIN TIME: CPT | Mod: QW

## 2025-02-22 NOTE — PROGRESS NOTES
Patient identification verified with 2 identifiers.    Location: [facility] at [facility] - [address] [address] [phone] option #1     Referring Physician: LALY  Enrollment/ Re-enrollment date: 25   INR Goal: 2.0-3.0  INR monitoring is per [facility] protocol.  Anticoagulation Medication: warfarin  Indication: DVT    Subjective   Bleeding signs/symptoms: No    Bruising: No   Major bleeding event: No  Thrombosis signs/symptoms: No  Thromboembolic event: No  Missed doses: No  Extra doses: No  Medication changes: No  Dietary changes: No  Change in health: No  Change in activity: No  Alcohol: No  Other concerns: No    Upcoming Procedures:  Does the Patient Have any upcoming procedures that require interruption in anticoagulation therapy? no  Does the patient require bridging? no      Anticoagulation Summary  As of 2025      INR goal:  2.0-3.0   TTR:  70.9% (1.4 y)   INR used for dosin.10 (2025)   Weekly warfarin total:  50 mg               Assessment/Plan   Therapeutic  Maintain TWD  RTC in 2 weeks      Education provided to patient during the visit:  Patient instructed to call in interim with questions, concerns and changes.   Patient educated on interactions between medications and warfarin.   Patient educated on dietary consistency in vitamin k consumption.   Patient educated on affects of alcohol consumption while taking warfarin.   Patient educated on signs of bleeding/clotting.   Patient educated on compliance with dosing, follow up appointments, and prescribed plan of care.

## 2025-02-23 LAB
ALBUMIN SERPL-MCNC: 4.3 G/DL (ref 3.6–5.1)
ALP SERPL-CCNC: 60 U/L (ref 37–153)
ALT SERPL-CCNC: 12 U/L (ref 6–29)
ANION GAP SERPL CALCULATED.4IONS-SCNC: 6 MMOL/L (CALC) (ref 7–17)
AST SERPL-CCNC: 19 U/L (ref 10–35)
BASOPHILS # BLD AUTO: 22 CELLS/UL (ref 0–200)
BASOPHILS NFR BLD AUTO: 0.5 %
BILIRUB SERPL-MCNC: 0.4 MG/DL (ref 0.2–1.2)
BUN SERPL-MCNC: 12 MG/DL (ref 7–25)
CALCIUM SERPL-MCNC: 9.1 MG/DL (ref 8.6–10.4)
CHLORIDE SERPL-SCNC: 108 MMOL/L (ref 98–110)
CO2 SERPL-SCNC: 25 MMOL/L (ref 20–32)
CREAT SERPL-MCNC: 1.05 MG/DL (ref 0.5–1.03)
CRP SERPL-MCNC: NORMAL MG/L
EGFRCR SERPLBLD CKD-EPI 2021: 64 ML/MIN/1.73M2
EOSINOPHIL # BLD AUTO: 90 CELLS/UL (ref 15–500)
EOSINOPHIL NFR BLD AUTO: 2.1 %
ERYTHROCYTE [DISTWIDTH] IN BLOOD BY AUTOMATED COUNT: 23.3 % (ref 11–15)
GLUCOSE SERPL-MCNC: 105 MG/DL (ref 65–139)
HCT VFR BLD AUTO: 36.7 % (ref 35–45)
HGB BLD-MCNC: 10.4 G/DL (ref 11.7–15.5)
LYMPHOCYTES # BLD AUTO: 1785 CELLS/UL (ref 850–3900)
LYMPHOCYTES NFR BLD AUTO: 41.5 %
MCH RBC QN AUTO: 22.5 PG (ref 27–33)
MCHC RBC AUTO-ENTMCNC: 28.3 G/DL (ref 32–36)
MCV RBC AUTO: 79.3 FL (ref 80–100)
MONOCYTES # BLD AUTO: 765 CELLS/UL (ref 200–950)
MONOCYTES NFR BLD AUTO: 17.8 %
NEUTROPHILS # BLD AUTO: 1638 CELLS/UL (ref 1500–7800)
NEUTROPHILS NFR BLD AUTO: 38.1 %
PLATELET # BLD AUTO: 169 THOUSAND/UL (ref 140–400)
PMV BLD REES-ECKER: ABNORMAL FL
POTASSIUM SERPL-SCNC: 4.8 MMOL/L (ref 3.5–5.3)
PROT SERPL-MCNC: 7 G/DL (ref 6.1–8.1)
RBC # BLD AUTO: 4.63 MILLION/UL (ref 3.8–5.1)
SERVICE CMNT-IMP: ABNORMAL
SODIUM SERPL-SCNC: 139 MMOL/L (ref 135–146)
WBC # BLD AUTO: 4.3 THOUSAND/UL (ref 3.8–10.8)

## 2025-02-24 LAB
ALBUMIN SERPL-MCNC: 4.3 G/DL (ref 3.6–5.1)
ALP SERPL-CCNC: 60 U/L (ref 37–153)
ALT SERPL-CCNC: 12 U/L (ref 6–29)
ANION GAP SERPL CALCULATED.4IONS-SCNC: 6 MMOL/L (CALC) (ref 7–17)
AST SERPL-CCNC: 19 U/L (ref 10–35)
BASOPHILS # BLD AUTO: 22 CELLS/UL (ref 0–200)
BASOPHILS NFR BLD AUTO: 0.5 %
BILIRUB SERPL-MCNC: 0.4 MG/DL (ref 0.2–1.2)
BUN SERPL-MCNC: 12 MG/DL (ref 7–25)
CALCIUM SERPL-MCNC: 9.1 MG/DL (ref 8.6–10.4)
CHLORIDE SERPL-SCNC: 108 MMOL/L (ref 98–110)
CO2 SERPL-SCNC: 25 MMOL/L (ref 20–32)
CREAT SERPL-MCNC: 1.05 MG/DL (ref 0.5–1.03)
CRP SERPL-MCNC: <3 MG/L
EGFRCR SERPLBLD CKD-EPI 2021: 64 ML/MIN/1.73M2
EOSINOPHIL # BLD AUTO: 90 CELLS/UL (ref 15–500)
EOSINOPHIL NFR BLD AUTO: 2.1 %
ERYTHROCYTE [DISTWIDTH] IN BLOOD BY AUTOMATED COUNT: 23.3 % (ref 11–15)
GLUCOSE SERPL-MCNC: 105 MG/DL (ref 65–139)
HCT VFR BLD AUTO: 36.7 % (ref 35–45)
HGB BLD-MCNC: 10.4 G/DL (ref 11.7–15.5)
LYMPHOCYTES # BLD AUTO: 1785 CELLS/UL (ref 850–3900)
LYMPHOCYTES NFR BLD AUTO: 41.5 %
MCH RBC QN AUTO: 22.5 PG (ref 27–33)
MCHC RBC AUTO-ENTMCNC: 28.3 G/DL (ref 32–36)
MCV RBC AUTO: 79.3 FL (ref 80–100)
MONOCYTES # BLD AUTO: 765 CELLS/UL (ref 200–950)
MONOCYTES NFR BLD AUTO: 17.8 %
NEUTROPHILS # BLD AUTO: 1638 CELLS/UL (ref 1500–7800)
NEUTROPHILS NFR BLD AUTO: 38.1 %
PLATELET # BLD AUTO: 169 THOUSAND/UL (ref 140–400)
PMV BLD REES-ECKER: ABNORMAL FL
POTASSIUM SERPL-SCNC: 4.8 MMOL/L (ref 3.5–5.3)
PROT SERPL-MCNC: 7 G/DL (ref 6.1–8.1)
RBC # BLD AUTO: 4.63 MILLION/UL (ref 3.8–5.1)
SERVICE CMNT-IMP: ABNORMAL
SODIUM SERPL-SCNC: 139 MMOL/L (ref 135–146)
WBC # BLD AUTO: 4.3 THOUSAND/UL (ref 3.8–10.8)

## 2025-02-28 ENCOUNTER — APPOINTMENT (OUTPATIENT)
Dept: RHEUMATOLOGY | Facility: CLINIC | Age: 50
End: 2025-02-28
Payer: COMMERCIAL

## 2025-02-28 VITALS
WEIGHT: 183.8 LBS | BODY MASS INDEX: 31.38 KG/M2 | HEART RATE: 72 BPM | HEIGHT: 64 IN | TEMPERATURE: 97.9 F | SYSTOLIC BLOOD PRESSURE: 181 MMHG | DIASTOLIC BLOOD PRESSURE: 115 MMHG | OXYGEN SATURATION: 98 %

## 2025-02-28 DIAGNOSIS — M70.41 HEMORRHAGIC PREPATELLAR BURSITIS OF RIGHT KNEE: Primary | ICD-10-CM

## 2025-02-28 PROCEDURE — 99214 OFFICE O/P EST MOD 30 MIN: CPT | Performed by: INTERNAL MEDICINE

## 2025-02-28 PROCEDURE — 3008F BODY MASS INDEX DOCD: CPT | Performed by: INTERNAL MEDICINE

## 2025-02-28 PROCEDURE — 3077F SYST BP >= 140 MM HG: CPT | Performed by: INTERNAL MEDICINE

## 2025-02-28 PROCEDURE — 3080F DIAST BP >= 90 MM HG: CPT | Performed by: INTERNAL MEDICINE

## 2025-02-28 ASSESSMENT — PAIN SCALES - GENERAL: PAINLEVEL_OUTOF10: 7

## 2025-03-02 NOTE — PROGRESS NOTES
She complains of swelling in the anterior aspect of the right knee with some discomfort in the right knee to touch and with knee flexion.  She denies having any major trauma to the right knee.  She does have a tendency to strike her right knee against her desk at work.  She is on warfarin therapy.  She has not noted any epistaxis or bleeding gums.    Examination shows good passive range of motion of the upper and lower extremity joints.  There is fluctuant swelling over the anterior superior aspect of the right knee with pain on full flexion of the right knee.  There is no increased warmth.  The left knee is not swollen.    Laboratory (2/22/2025) CRP <3.0, BUN 12, creatinine 1.05, calcium 9.1, albumin 4.3, alkaline phosphatase 60, AST 19, ALT 12, WBC 4.3, hemoglobin 10.4, hematocrit 36.7, MCV 79.3, MCHC 28.3, platelets 169, INR 2.10.    She has rheumatoid arthritis/Sjogren syndrome, G6PD deficiency, she is on warfarin therapy for status post left lower extremity deep vein thrombosis, polycystic ovarian syndrome, left carpal tunnel syndrome, microcytic anemia with RBC fragments and elevated reticulocyte count previously with anemia may be related to leflunomide therapy as well as to warfarin therapy.  She has degenerative arthritis of the hips by x-ray (10/9/2023).  She has slightly elevated serum creatinine.    The right prepatellar bursa was aspirated for 10 mL of bloody fluid that did not coagulate consistent with a hemorrhagic prepatellar bursitis likely related to warfarin therapy.  She is to continue leflunomide 20 mg daily and gabapentin 300 mg twice daily.  She is to continue follow-up with warfarin clinic.  She is to return at her previously scheduled follow-up appointment.

## 2025-03-08 ENCOUNTER — ANTICOAGULATION - WARFARIN VISIT (OUTPATIENT)
Dept: CARDIOLOGY | Facility: CLINIC | Age: 50
End: 2025-03-08
Payer: COMMERCIAL

## 2025-03-08 DIAGNOSIS — I82.4Y2 ACUTE VENOUS EMBOLISM AND THROMBOSIS OF DEEP VESSELS OF PROXIMAL END OF LEFT LOWER EXTREMITY (MULTI): Primary | ICD-10-CM

## 2025-03-08 DIAGNOSIS — I82.492 DEEP VEIN THROMBOSIS (DVT) OF OTHER VEIN OF LEFT LOWER EXTREMITY, UNSPECIFIED CHRONICITY (MULTI): ICD-10-CM

## 2025-03-08 LAB
POC INR: 1.8
POC PROTHROMBIN TIME: NORMAL

## 2025-03-08 PROCEDURE — 85610 PROTHROMBIN TIME: CPT | Mod: QW

## 2025-03-08 PROCEDURE — 99211 OFF/OP EST MAY X REQ PHY/QHP: CPT

## 2025-03-08 NOTE — PROGRESS NOTES
Patient identification verified with 2 identifiers.    Location: [facility] at [facility] - [address] [address] [phone] option #1     Referring Physician: LALY  Enrollment/ Re-enrollment date: 25   INR Goal: 2.0-3.0  INR monitoring is per [facility] protocol.  Anticoagulation Medication: warfarin  Indication: DVT    Subjective   Bleeding signs/symptoms: No    Bruising: No   Major bleeding event: No  Thrombosis signs/symptoms: No  Thromboembolic event: No  Missed doses: Yes  missed dose  Extra doses: No  Medication changes: No  Dietary changes: No  Change in health: No  Change in activity: No  Alcohol: No  Other concerns: Yes  blood/fluid removed from knee    Upcoming Procedures:  Does the Patient Have any upcoming procedures that require interruption in anticoagulation therapy? no  Does the patient require bridging? no      Anticoagulation Summary  As of 3/8/2025      INR goal:  2.0-3.0   TTR:  69.9% (1.5 y)   INR used for dosin.80 (3/8/2025)   Weekly warfarin total:  50 mg               Assessment/Plan   Sub therapeutic in setting of one missed dose.  Will give one time dose increase   Maintain TWD  RTC in 2 weeks as pt needs a Saturday appointment      Education provided to patient during the visit:  Patient instructed to call in interim with questions, concerns and changes.   Patient educated on interactions between medications and warfarin.   Patient educated on dietary consistency in vitamin k consumption.   Patient educated on affects of alcohol consumption while taking warfarin.   Patient educated on signs of bleeding/clotting.   Patient educated on compliance with dosing, follow up appointments, and prescribed plan of care.

## 2025-03-22 ENCOUNTER — ANTICOAGULATION - WARFARIN VISIT (OUTPATIENT)
Dept: CARDIOLOGY | Facility: CLINIC | Age: 50
End: 2025-03-22
Payer: COMMERCIAL

## 2025-03-22 DIAGNOSIS — I82.492 DEEP VEIN THROMBOSIS (DVT) OF OTHER VEIN OF LEFT LOWER EXTREMITY, UNSPECIFIED CHRONICITY (MULTI): ICD-10-CM

## 2025-03-22 DIAGNOSIS — I82.4Y2 ACUTE VENOUS EMBOLISM AND THROMBOSIS OF DEEP VESSELS OF PROXIMAL END OF LEFT LOWER EXTREMITY (MULTI): Primary | ICD-10-CM

## 2025-03-22 LAB
POC INR: 1
POC PROTHROMBIN TIME: NORMAL

## 2025-03-22 PROCEDURE — 99211 OFF/OP EST MAY X REQ PHY/QHP: CPT

## 2025-03-22 PROCEDURE — 85610 PROTHROMBIN TIME: CPT | Mod: QW

## 2025-03-22 NOTE — PROGRESS NOTES
"Patient identification verified with 2 identifiers.    Location: [facility] at [facility] - [address] [address] [phone] option #1     Referring Physician: LALY  Enrollment/ Re-enrollment date: 25   INR Goal: 2.0-3.0  INR monitoring is per [facility] protocol.  Anticoagulation Medication: warfarin  Indication: DVT    Subjective   Bleeding signs/symptoms: No    Bruising: No   Major bleeding event: No  Thrombosis signs/symptoms: No  Thromboembolic event: No  Missed doses: Yes  missed \"several\" doses last week  Extra doses: No  Medication changes: No  Dietary changes: No  Change in health: No  Change in activity: No  Alcohol: No  Other concerns: No    Upcoming Procedures:  Does the Patient Have any upcoming procedures that require interruption in anticoagulation therapy? no  Does the patient require bridging? no      Anticoagulation Summary  As of 3/22/2025      INR goal:  2.0-3.0   TTR:  68.1% (1.5 y)   INR used for dosin.00 (3/22/2025)   Weekly warfarin total:  50 mg               Assessment/Plan   Sub therapeutic in setting of several missed doses.  Reminded pt about importance of taking warfarin as prescribed . Pill box given.  Will give dose increase today and tomorrow then maintain TWD.  RTC in 6 days per pt availability.  Dr. Oro informed of INR and plan and is in agreement.      Education provided to patient during the visit:  Patient instructed to call in interim with questions, concerns and changes.   Patient educated on interactions between medications and warfarin.   Patient educated on dietary consistency in vitamin k consumption.   Patient educated on affects of alcohol consumption while taking warfarin.   Patient educated on signs of bleeding/clotting.   Patient educated on compliance with dosing, follow up appointments, and prescribed plan of care.        "

## 2025-03-28 ENCOUNTER — ANTICOAGULATION - WARFARIN VISIT (OUTPATIENT)
Dept: CARDIOLOGY | Facility: CLINIC | Age: 50
End: 2025-03-28
Payer: COMMERCIAL

## 2025-03-28 DIAGNOSIS — I82.492 DEEP VEIN THROMBOSIS (DVT) OF OTHER VEIN OF LEFT LOWER EXTREMITY, UNSPECIFIED CHRONICITY: ICD-10-CM

## 2025-03-28 DIAGNOSIS — I82.4Y2 ACUTE VENOUS EMBOLISM AND THROMBOSIS OF DEEP VESSELS OF PROXIMAL END OF LEFT LOWER EXTREMITY: Primary | ICD-10-CM

## 2025-03-28 LAB
POC INR: 2.4
POC PROTHROMBIN TIME: NORMAL

## 2025-03-28 PROCEDURE — 99211 OFF/OP EST MAY X REQ PHY/QHP: CPT

## 2025-03-28 PROCEDURE — 85610 PROTHROMBIN TIME: CPT | Mod: QW

## 2025-03-28 NOTE — PROGRESS NOTES
Patient identification verified with 2 identifiers.    Location: [facility] at [facility] - [address] [address] [phone] option #1     Referring Physician: LALY  Enrollment/ Re-enrollment date: 25   INR Goal: 2.0-3.0  INR monitoring is per [facility] protocol.  Anticoagulation Medication: warfarin  Indication: DVT    Subjective   Bleeding signs/symptoms: No    Bruising: No   Major bleeding event: No  Thrombosis signs/symptoms: No  Thromboembolic event: No  Missed doses: No  Extra doses: No  Medication changes: No  Dietary changes: No  Change in health: No  Change in activity: No  Alcohol: No  Other concerns: No    Upcoming Procedures:  Does the Patient Have any upcoming procedures that require interruption in anticoagulation therapy? no  Does the patient require bridging? no      Anticoagulation Summary  As of 3/28/2025      INR goal:  2.0-3.0   TTR:  67.7% (1.5 y)   INR used for dosin.40 (3/28/2025)   Weekly warfarin total:  50 mg               Assessment/Plan   Therapeutic  Maintain TWD  RTC in 1 week      Education provided to patient during the visit:  Patient instructed to call in interim with questions, concerns and changes.   Patient educated on interactions between medications and warfarin.   Patient educated on dietary consistency in vitamin k consumption.   Patient educated on affects of alcohol consumption while taking warfarin.   Patient educated on signs of bleeding/clotting.   Patient educated on compliance with dosing, follow up appointments, and prescribed plan of care.

## 2025-04-02 DIAGNOSIS — M25.50 ARTHRALGIA, UNSPECIFIED JOINT: ICD-10-CM

## 2025-04-03 RX ORDER — GABAPENTIN 300 MG/1
300 CAPSULE ORAL 2 TIMES DAILY
Qty: 60 CAPSULE | Refills: 0 | Status: SHIPPED | OUTPATIENT
Start: 2025-04-03

## 2025-04-04 ENCOUNTER — ANTICOAGULATION - WARFARIN VISIT (OUTPATIENT)
Dept: CARDIOLOGY | Facility: CLINIC | Age: 50
End: 2025-04-04
Payer: COMMERCIAL

## 2025-04-04 DIAGNOSIS — I82.4Y2 ACUTE VENOUS EMBOLISM AND THROMBOSIS OF DEEP VESSELS OF PROXIMAL END OF LEFT LOWER EXTREMITY: Primary | ICD-10-CM

## 2025-04-04 DIAGNOSIS — I82.492 DEEP VEIN THROMBOSIS (DVT) OF OTHER VEIN OF LEFT LOWER EXTREMITY, UNSPECIFIED CHRONICITY: ICD-10-CM

## 2025-04-04 LAB
POC INR: 2.8
POC PROTHROMBIN TIME: NORMAL

## 2025-04-04 PROCEDURE — 85610 PROTHROMBIN TIME: CPT | Mod: QW

## 2025-04-04 NOTE — PROGRESS NOTES
Patient identification verified with 2 identifiers.    Location: [email or web address] at [facility] - [address] [address] [phone] option #1     Referring Physician: Dr Matheus Oro  Enrollment/ Re-enrollment date: 2025   INR Goal: 2.0-3.0  INR monitoring is per [facility] protocol.  Anticoagulation Medication: warfarin  Indication: Deep Vein Thrombosis (DVT)    Subjective   Bleeding signs/symptoms: No    Bruising: No   Major bleeding event: No  Thrombosis signs/symptoms: No  Thromboembolic event: No  Missed doses: No  Extra doses: No  Medication changes: No  Dietary changes: No  Change in health: No  Change in activity: No  Alcohol: No  Other concerns: No    Upcoming Procedures:  Does the Patient Have any upcoming procedures that require interruption in anticoagulation therapy? no  Does the patient require bridging? no      Anticoagulation Summary  As of 2025      INR goal:  2.0-3.0   TTR:  68.1% (1.5 y)   INR used for dosin.80 (2025)   Weekly warfarin total:  50 mg               Assessment/Plan   Therapeutic     1. New dose: no change    2. Next INR: 2 weeks      Education provided to patient during the visit:  Patient instructed to call in interim with questions, concerns and changes.   Patient educated on interactions between medications and warfarin.   Patient educated on dietary consistency in vitamin k consumption.   Patient educated on affects of alcohol consumption while taking warfarin.   Patient educated on signs of bleeding/clotting.   Patient educated on compliance with dosing, follow up appointments, and prescribed plan of care.

## 2025-04-05 ENCOUNTER — APPOINTMENT (OUTPATIENT)
Dept: CARDIOLOGY | Facility: CLINIC | Age: 50
End: 2025-04-05
Payer: COMMERCIAL

## 2025-04-19 ENCOUNTER — ANTICOAGULATION - WARFARIN VISIT (OUTPATIENT)
Dept: CARDIOLOGY | Facility: CLINIC | Age: 50
End: 2025-04-19
Payer: COMMERCIAL

## 2025-04-19 DIAGNOSIS — I82.492 DEEP VEIN THROMBOSIS (DVT) OF OTHER VEIN OF LEFT LOWER EXTREMITY, UNSPECIFIED CHRONICITY: ICD-10-CM

## 2025-04-19 DIAGNOSIS — I82.4Y2 ACUTE VENOUS EMBOLISM AND THROMBOSIS OF DEEP VESSELS OF PROXIMAL END OF LEFT LOWER EXTREMITY: Primary | ICD-10-CM

## 2025-04-19 LAB
POC INR: 2.3 (ref 0.9–1.1)
POC PROTHROMBIN TIME: ABNORMAL (ref 9.3–12.5)

## 2025-04-19 PROCEDURE — 85610 PROTHROMBIN TIME: CPT | Mod: QW

## 2025-04-19 PROCEDURE — 99211 OFF/OP EST MAY X REQ PHY/QHP: CPT

## 2025-04-19 NOTE — PROGRESS NOTES
Patient identification verified with 2 identifiers.    Location: [facility] at [facility] - [address] [address] [phone] option #1     Referring Physician: DR. RUFFIN  Enrollment/ Re-enrollment date: 25   INR Goal: 2.0-3.0  INR monitoring is per [facility] protocol.  Anticoagulation Medication: warfarin  Indication: Deep Vein Thrombosis (DVT)    Subjective   Bleeding signs/symptoms: No    Bruising: No   Major bleeding event: No  Thrombosis signs/symptoms: No  Thromboembolic event: No  Missed doses: No  Extra doses: No  Medication changes: Yes  PT TOOK A DOSE OF FLUCONAZOLE ON 25  Dietary changes: No  Change in health: No  Change in activity: No  Alcohol: No  Other concerns: No    Upcoming Procedures:  Does the Patient Have any upcoming procedures that require interruption in anticoagulation therapy? no  Does the patient require bridging? no      Anticoagulation Summary  As of 2025      INR goal:  2.0-3.0   TTR:  69.0% (1.6 y)   INR used for dosin.30 (2025)   Weekly warfarin total:  50 mg               Assessment/Plan   Therapeutic     1. New dose: no change    2. Next INR: 1 month      Education provided to patient during the visit:  Patient instructed to call in interim with questions, concerns and changes.   Patient educated on interactions between medications and warfarin.   Patient educated on signs of bleeding/clotting.

## 2025-05-01 ENCOUNTER — PATIENT MESSAGE (OUTPATIENT)
Dept: OBSTETRICS AND GYNECOLOGY | Facility: CLINIC | Age: 50
End: 2025-05-01
Payer: COMMERCIAL

## 2025-05-07 ENCOUNTER — TELEPHONE (OUTPATIENT)
Facility: CLINIC | Age: 50
End: 2025-05-07

## 2025-05-09 ENCOUNTER — OFFICE VISIT (OUTPATIENT)
Facility: CLINIC | Age: 50
End: 2025-05-09
Payer: COMMERCIAL

## 2025-05-09 VITALS — DIASTOLIC BLOOD PRESSURE: 130 MMHG | BODY MASS INDEX: 31.76 KG/M2 | WEIGHT: 185 LBS | SYSTOLIC BLOOD PRESSURE: 190 MMHG

## 2025-05-09 DIAGNOSIS — Z11.3 SCREENING EXAMINATION FOR STI: Primary | ICD-10-CM

## 2025-05-09 PROCEDURE — 3080F DIAST BP >= 90 MM HG: CPT | Performed by: OBSTETRICS & GYNECOLOGY

## 2025-05-09 PROCEDURE — 3077F SYST BP >= 140 MM HG: CPT | Performed by: OBSTETRICS & GYNECOLOGY

## 2025-05-09 PROCEDURE — 99214 OFFICE O/P EST MOD 30 MIN: CPT | Performed by: OBSTETRICS & GYNECOLOGY

## 2025-05-09 NOTE — PROGRESS NOTES
Julianne Baxter is a 50 y.o. female who presents with a chief complaint of Vaginitis/Bacterial Vaginosis (Vaginal irritation and discharge)      SUBJECTIVE  ***    Medical History[1]  Surgical History[2]  Social History[3]  Family History[4]    OB History    Para Term  AB Living   0 0 0      SAB IAB Ectopic Multiple Live Births              OBJECTIVE  RX Allergies[5]   Prescriptions Prior to Admission[6]     Review of Systems  {ros master:520178}  Physical Exam  {[facility]:42677}    BP (!) 190/130   Wt 83.9 kg (185 lb)   LMP  (LMP Unknown)   BMI 31.76 kg/m²    Problem List Items Addressed This Visit    None  Visit Diagnoses         Screening examination for STI    -  Primary    Relevant Orders    Trichomonas vaginalis, Amplified    C. trachomatis / N. gonorrhoeae, Amplified, Urogenital                    [1] No past medical history on file.  [2] No past surgical history on file.  [3]   Social History  Socioeconomic History    Marital status: Single   Tobacco Use    Smoking status: Never    Smokeless tobacco: Never   Vaping Use    Vaping status: Never Used   Substance and Sexual Activity    Alcohol use: Yes     Comment: occasional    Drug use: Never    Sexual activity: Yes     Birth control/protection: Implant   [4]   Family History  Problem Relation Name Age of Onset    Asthma Mother      Epilepsy Mother      Diabetes Father      Hypertension Father      Hyperlipidemia Father      Breast cancer Father's Sister     [5]   Allergies  Allergen Reactions    Lisinopril Swelling     Facial Swelling    [6] (Not in a hospital admission)     file.  [2] No past surgical history on file.  [3]   Social History  Socioeconomic History    Marital status: Single   Tobacco Use    Smoking status: Never    Smokeless tobacco: Never   Vaping Use    Vaping status: Never Used   Substance and Sexual Activity    Alcohol use: Yes     Comment: occasional    Drug use: Never    Sexual activity: Yes     Birth control/protection: Implant   [4]   Family History  Problem Relation Name Age of Onset    Asthma Mother      Epilepsy Mother      Diabetes Father      Hypertension Father      Hyperlipidemia Father      Breast cancer Father's Sister     [5]   Allergies  Allergen Reactions    Lisinopril Swelling     Facial Swelling    [6] (Not in a hospital admission)

## 2025-05-10 LAB
C TRACH RRNA SPEC QL NAA+PROBE: NOT DETECTED
N GONORRHOEA RRNA SPEC QL NAA+PROBE: NOT DETECTED
QUEST GC CT AMPLIFIED (ALWAYS MESSAGE): NORMAL
T VAGINALIS RRNA SPEC QL NAA+PROBE: NOT DETECTED

## 2025-05-17 ENCOUNTER — ANTICOAGULATION - WARFARIN VISIT (OUTPATIENT)
Dept: CARDIOLOGY | Facility: CLINIC | Age: 50
End: 2025-05-17
Payer: COMMERCIAL

## 2025-05-17 DIAGNOSIS — I82.492 DEEP VEIN THROMBOSIS (DVT) OF OTHER VEIN OF LEFT LOWER EXTREMITY, UNSPECIFIED CHRONICITY: ICD-10-CM

## 2025-05-17 DIAGNOSIS — I82.4Y2 ACUTE VENOUS EMBOLISM AND THROMBOSIS OF DEEP VESSELS OF PROXIMAL END OF LEFT LOWER EXTREMITY: Primary | ICD-10-CM

## 2025-05-17 LAB
POC INR: 3 (ref 0.9–1.1)
POC PROTHROMBIN TIME: ABNORMAL (ref 9.3–12.5)

## 2025-05-17 PROCEDURE — 99211 OFF/OP EST MAY X REQ PHY/QHP: CPT

## 2025-05-17 PROCEDURE — 85610 PROTHROMBIN TIME: CPT | Mod: QW

## 2025-05-17 NOTE — PROGRESS NOTES
Patient identification verified with 2 identifiers.    Location: [facility] - [address] [address] [phone] option #1     Referring Physician: DR. RUFFIN  Enrollment/ Re-enrollment date: 11/16/25   INR Goal: 2.0-3.0  INR monitoring is per [facility] protocol.  Anticoagulation Medication: warfarin  Indication: Deep Vein Thrombosis (DVT)    Subjective   Bleeding signs/symptoms: No    Bruising: No   Major bleeding event: No  Thrombosis signs/symptoms: No  Thromboembolic event: No  Missed doses: No  Extra doses: No  Medication changes: No  Dietary changes: No  Change in health: No  Change in activity: No  Alcohol: No  Other concerns: No    Upcoming Procedures:  Does the Patient Have any upcoming procedures that require interruption in anticoagulation therapy? no  Does the patient require bridging? no      Anticoagulation Summary  As of 5/17/2025      INR goal:  2.0-3.0   TTR:  70.4% (1.6 y)   INR used for dosing:  3.00 (5/17/2025)   Weekly warfarin total:  50 mg               Assessment/Plan   Therapeutic     1. New dose: no change    2. Next INR: 1 month      Education provided to patient during the visit:  Patient instructed to call in interim with questions, concerns and changes.   Patient educated on interactions between medications and warfarin.   Patient educated on signs of bleeding/clotting.

## 2025-05-30 ENCOUNTER — APPOINTMENT (OUTPATIENT)
Dept: RADIOLOGY | Facility: CLINIC | Age: 50
End: 2025-05-30
Payer: COMMERCIAL

## 2025-05-30 VITALS — HEIGHT: 64 IN | BODY MASS INDEX: 31.58 KG/M2 | WEIGHT: 184.97 LBS

## 2025-05-30 DIAGNOSIS — Z12.31 VISIT FOR SCREENING MAMMOGRAM: ICD-10-CM

## 2025-05-30 PROCEDURE — 77067 SCR MAMMO BI INCL CAD: CPT

## 2025-06-14 ENCOUNTER — ANTICOAGULATION - WARFARIN VISIT (OUTPATIENT)
Dept: CARDIOLOGY | Facility: CLINIC | Age: 50
End: 2025-06-14
Payer: COMMERCIAL

## 2025-06-14 DIAGNOSIS — I82.4Y2 ACUTE VENOUS EMBOLISM AND THROMBOSIS OF DEEP VESSELS OF PROXIMAL END OF LEFT LOWER EXTREMITY: Primary | ICD-10-CM

## 2025-06-14 DIAGNOSIS — I82.492 DEEP VEIN THROMBOSIS (DVT) OF OTHER VEIN OF LEFT LOWER EXTREMITY, UNSPECIFIED CHRONICITY: ICD-10-CM

## 2025-06-14 LAB
POC INR: 2.9 (ref 0.9–1.1)
POC PROTHROMBIN TIME: ABNORMAL (ref 9.3–12.5)

## 2025-06-14 PROCEDURE — 85610 PROTHROMBIN TIME: CPT | Mod: QW

## 2025-06-14 PROCEDURE — 99211 OFF/OP EST MAY X REQ PHY/QHP: CPT

## 2025-06-14 NOTE — PROGRESS NOTES
Patient identification verified with 2 identifiers.    Location: [facility] - [address] [address] [phone] option #1     Referring Physician: DR. RUFFIN  Enrollment/ Re-enrollment date: 25   INR Goal: 2.0-3.0  INR monitoring is per [facility] protocol.  Anticoagulation Medication: warfarin  Indication: Deep Vein Thrombosis (DVT)    Subjective   Bleeding signs/symptoms: No    Bruising: No   Major bleeding event: No  Thrombosis signs/symptoms: No  Thromboembolic event: No  Missed doses: No  Extra doses: No  Medication changes: No  Dietary changes: No  Change in health: No  Change in activity: No  Alcohol: No  Other concerns: No    Upcoming Procedures:  Does the Patient Have any upcoming procedures that require interruption in anticoagulation therapy? no  Does the patient require bridging? no      Anticoagulation Summary  As of 2025      INR goal:  2.0-3.0   TTR:  71.7% (1.7 y)   INR used for dosin.90 (2025)   Weekly warfarin total:  50 mg               Assessment/Plan   Therapeutic     1. New dose: no change    2. Next INR: 1 month      Education provided to patient during the visit:  Patient instructed to call in interim with questions, concerns and changes.   Patient educated on interactions between medications and warfarin.   Patient educated on signs of bleeding/clotting.

## 2025-07-12 ENCOUNTER — ANTICOAGULATION - WARFARIN VISIT (OUTPATIENT)
Dept: CARDIOLOGY | Facility: CLINIC | Age: 50
End: 2025-07-12
Payer: COMMERCIAL

## 2025-07-12 DIAGNOSIS — I82.4Y2 ACUTE VENOUS EMBOLISM AND THROMBOSIS OF DEEP VESSELS OF PROXIMAL END OF LEFT LOWER EXTREMITY: Primary | ICD-10-CM

## 2025-07-12 DIAGNOSIS — I82.492 DEEP VEIN THROMBOSIS (DVT) OF OTHER VEIN OF LEFT LOWER EXTREMITY, UNSPECIFIED CHRONICITY: ICD-10-CM

## 2025-07-12 LAB
POC INR: 2.3 (ref 0.9–1.1)
POC PROTHROMBIN TIME: ABNORMAL (ref 9.3–12.5)

## 2025-07-12 PROCEDURE — 99211 OFF/OP EST MAY X REQ PHY/QHP: CPT

## 2025-07-12 PROCEDURE — 85610 PROTHROMBIN TIME: CPT | Mod: QW

## 2025-07-12 NOTE — PROGRESS NOTES
Patient identification verified with 2 identifiers.    Location: [facility] at [facility] - [address] [address] [phone] option #1     Referring Physician: DR. RUFFIN  Enrollment/ Re-enrollment date: 25   INR Goal: 2.0-3.0  INR monitoring is per [facility] protocol.  Anticoagulation Medication: warfarin  Indication: Deep Vein Thrombosis (DVT)    Subjective   Bleeding signs/symptoms: No    Bruising: No   Major bleeding event: No  Thrombosis signs/symptoms: No  Thromboembolic event: No  Missed doses: No  Extra doses: No  Medication changes: No  Dietary changes: No  Change in health: No  Change in activity: No  Alcohol: No  Other concerns: No    Upcoming Procedures:  Does the Patient Have any upcoming procedures that require interruption in anticoagulation therapy? no  Does the patient require bridging? no      Anticoagulation Summary  As of 2025      INR goal:  2.0-3.0   TTR:  72.9% (1.8 y)   INR used for dosin.30 (2025)   Weekly warfarin total:  50 mg               Assessment/Plan   Therapeutic     1. New dose: no change    2. Next INR: 1 month      Education provided to patient during the visit:  Patient instructed to call in interim with questions, concerns and changes.   Patient educated on interactions between medications and warfarin.   Patient educated on signs of bleeding/clotting.

## 2025-08-08 ENCOUNTER — APPOINTMENT (OUTPATIENT)
Dept: OBSTETRICS AND GYNECOLOGY | Facility: CLINIC | Age: 50
End: 2025-08-08
Payer: COMMERCIAL

## 2025-08-08 ENCOUNTER — ANTICOAGULATION - WARFARIN VISIT (OUTPATIENT)
Dept: CARDIOLOGY | Facility: CLINIC | Age: 50
End: 2025-08-08
Payer: COMMERCIAL

## 2025-08-08 VITALS
SYSTOLIC BLOOD PRESSURE: 120 MMHG | WEIGHT: 186 LBS | HEART RATE: 78 BPM | DIASTOLIC BLOOD PRESSURE: 80 MMHG | BODY MASS INDEX: 31.91 KG/M2

## 2025-08-08 DIAGNOSIS — Z30.017 ENCOUNTER FOR INSERTION SUBDERMAL CONTRACEPTIVE: Primary | ICD-10-CM

## 2025-08-08 DIAGNOSIS — Z01.419 ENCOUNTER FOR GYNECOLOGICAL EXAMINATION WITHOUT ABNORMAL FINDING: Primary | ICD-10-CM

## 2025-08-08 DIAGNOSIS — I82.4Y2 ACUTE VENOUS EMBOLISM AND THROMBOSIS OF DEEP VESSELS OF PROXIMAL END OF LEFT LOWER EXTREMITY: Primary | ICD-10-CM

## 2025-08-08 DIAGNOSIS — R89.4 POSITIVE TEST FOR HERPES SIMPLEX VIRUS ANTIBODY: ICD-10-CM

## 2025-08-08 DIAGNOSIS — N76.0 BACTERIAL VAGINOSIS: ICD-10-CM

## 2025-08-08 DIAGNOSIS — N76.2 ACUTE VULVITIS: ICD-10-CM

## 2025-08-08 DIAGNOSIS — B96.89 BACTERIAL VAGINOSIS: ICD-10-CM

## 2025-08-08 DIAGNOSIS — N93.9 ABNORMAL UTERINE BLEEDING: ICD-10-CM

## 2025-08-08 DIAGNOSIS — I82.492 DEEP VEIN THROMBOSIS (DVT) OF OTHER VEIN OF LEFT LOWER EXTREMITY, UNSPECIFIED CHRONICITY: ICD-10-CM

## 2025-08-08 DIAGNOSIS — Z11.3 SCREENING EXAMINATION FOR STI: ICD-10-CM

## 2025-08-08 DIAGNOSIS — Z12.11 SCREENING FOR COLON CANCER: ICD-10-CM

## 2025-08-08 LAB
POC INR: 2.1 (ref 0.9–1.1)
POC PROTHROMBIN TIME: ABNORMAL (ref 9.3–12.5)

## 2025-08-08 PROCEDURE — 3074F SYST BP LT 130 MM HG: CPT | Performed by: OBSTETRICS & GYNECOLOGY

## 2025-08-08 PROCEDURE — 3079F DIAST BP 80-89 MM HG: CPT | Performed by: OBSTETRICS & GYNECOLOGY

## 2025-08-08 PROCEDURE — 1036F TOBACCO NON-USER: CPT | Performed by: OBSTETRICS & GYNECOLOGY

## 2025-08-08 PROCEDURE — 99396 PREV VISIT EST AGE 40-64: CPT | Performed by: OBSTETRICS & GYNECOLOGY

## 2025-08-08 PROCEDURE — 85610 PROTHROMBIN TIME: CPT | Mod: QW | Performed by: INTERNAL MEDICINE

## 2025-08-08 PROCEDURE — 99211 OFF/OP EST MAY X REQ PHY/QHP: CPT

## 2025-08-08 RX ORDER — METRONIDAZOLE 500 MG/1
500 TABLET ORAL 2 TIMES DAILY
Qty: 14 TABLET | Refills: 1 | Status: SHIPPED | OUTPATIENT
Start: 2025-08-08 | End: 2025-08-17

## 2025-08-08 RX ORDER — ETONOGESTREL 68 MG/1
1 IMPLANT SUBCUTANEOUS ONCE
Qty: 1 EACH | Refills: 0 | Status: SHIPPED | OUTPATIENT
Start: 2025-08-08 | End: 2025-08-08

## 2025-08-08 RX ORDER — CLOTRIMAZOLE AND BETAMETHASONE DIPROPIONATE 10; .64 MG/G; MG/G
CREAM TOPICAL DAILY
Qty: 15 G | Refills: 3 | Status: SHIPPED | OUTPATIENT
Start: 2025-08-08

## 2025-08-08 RX ORDER — VALACYCLOVIR HYDROCHLORIDE 500 MG/1
500 TABLET, FILM COATED ORAL DAILY
Qty: 90 TABLET | Refills: 2 | Status: SHIPPED | OUTPATIENT
Start: 2025-08-08 | End: 2026-08-08

## 2025-08-08 RX ORDER — FLUCONAZOLE 150 MG/1
150 TABLET ORAL ONCE
Qty: 2 TABLET | Refills: 2 | Status: SHIPPED | OUTPATIENT
Start: 2025-08-08 | End: 2025-08-12

## 2025-08-08 ASSESSMENT — ENCOUNTER SYMPTOMS
LOSS OF SENSATION IN FEET: 0
DEPRESSION: 0
OCCASIONAL FEELINGS OF UNSTEADINESS: 0

## 2025-08-08 ASSESSMENT — PATIENT HEALTH QUESTIONNAIRE - PHQ9
SUM OF ALL RESPONSES TO PHQ9 QUESTIONS 1 AND 2: 0
2. FEELING DOWN, DEPRESSED OR HOPELESS: NOT AT ALL
1. LITTLE INTEREST OR PLEASURE IN DOING THINGS: NOT AT ALL

## 2025-08-08 ASSESSMENT — COLUMBIA-SUICIDE SEVERITY RATING SCALE - C-SSRS
6. HAVE YOU EVER DONE ANYTHING, STARTED TO DO ANYTHING, OR PREPARED TO DO ANYTHING TO END YOUR LIFE?: NO
2. HAVE YOU ACTUALLY HAD ANY THOUGHTS OF KILLING YOURSELF?: NO
1. IN THE PAST MONTH, HAVE YOU WISHED YOU WERE DEAD OR WISHED YOU COULD GO TO SLEEP AND NOT WAKE UP?: NO

## 2025-08-08 NOTE — PROGRESS NOTES
"Subjective   Julianne Baxter is a 50 y.o. female here for a routine exam.  She has a history of DVT and is on Coumadin.  She has a Nexplanon that was inserted 2021.  It  in 2024.  Her last menses was 2024.    She has noted her typically clear discharge is \"not as clear\".  She denies fevers or chills or odor.  No itching.    No dysuria or change in bowel habits.  She has not had colon cancer screening yet.    She does have a history of HSV, with infrequent outbreaks.  She requests a refill of valacyclovir.    She also uses Diflucan for intermittent yeast symptoms, metronidazole for BV and Lotrisone externally.  Personal health questionnaire reviewed: yes.     Gynecologic History  No LMP recorded (lmp unknown). Patient has had an implant.  Contraception: Nexplanon  Last Pap: 24. Results were: normal  Last mammogram: 25. Results were: normal    Obstetric History  OB History    Para Term  AB Living   0 0 0      SAB IAB Ectopic Multiple Live Births              Objective   Constitutional: Alert and in no acute distress. Well developed, well nourished.   Head and Face: Head and face: Normal.    Eyes: Normal external exam - nonicteric sclera, extraocular movements intact (EOMI) and no ptosis.   Neck: No neck asymmetry. Supple. Thyroid not enlarged and there were no palpable thyroid nodules.    Pulmonary: No respiratory distress.   Chest: Breasts: Normal appearance, no nipple discharge and no skin changes. Palpation of breasts and axillae: No palpable mass and no axillary lymphadenopathy.   Abdomen: Soft nontender; no abdominal mass palpated. No organomegaly. No hernias.   Genitourinary: External genitalia: Normal. No inguinal lymphadenopathy. Bartholin's Urethral and Skenes Glands: Normal. Urethra: Normal.  Bladder: Normal on palpation. Vagina: Normal. Cervix: Normal.  Uterus: Normal.  Right Adnexa/parametria: Normal.  Left Adnexa/parametria: Normal.  Inspection of " Perianal Area: Normal.   Musculoskeletal: No joint swelling seen, normal movements of all extremities.   Skin: Normal skin color and pigmentation, normal skin turgor, and no rash.  Nexplanon is palpable in the left inner arm.  Neurologic: Non-focal. Grossly intact.   Psychiatric: Alert and oriented x 3. Affect normal to patient baseline. Mood: Appropriate.  Physical Exam     Assessment/Plan   Healthy female exam.  This is a 50-year-old female with a normal exam.  No Pap smear was sent, she is high risk HPV negative in .    Cultures were sent for chlamydia, gonorrhea and trichomonas.    Her routine mammogram is due in May 2026.    We discussed colon cancer screening options and a colonoscopy was ordered.    She does request a refill of Valtrex, Diflucan, metronidazole tablet, and Lotrisone.    Her Nexplanon is .  We discussed checking an FSH but she had a menses in July.  We plan on replacing the Nexplanon when the insurance is confirmed.    I will see her soon.  Education reviewed: self breast exams.  Contraception: Nexplanon.  Mammogram ordered.

## 2025-08-08 NOTE — PROGRESS NOTES
Patient identification verified with 2 identifiers.    Location: [facility] at [facility] - [address] [address] [phone] option #1     Referring Physician: Dr. TOM Oro  Enrollment/ Re-enrollment date: 25   INR Goal: 2.0-3.0  INR monitoring is per [facility] protocol.  Anticoagulation Medication: warfarin  Indication: Deep Vein Thrombosis (DVT)    Subjective   Bleeding signs/symptoms: No    Bruising: No   Major bleeding event: No  Thrombosis signs/symptoms: No  Thromboembolic event: No  Missed doses: No  Extra doses: No  Medication changes: No  Dietary changes: No  Change in health: No  Change in activity: No  Alcohol: No  Other concerns: No    Upcoming Procedures:  Does the Patient Have any upcoming procedures that require interruption in anticoagulation therapy? no  Does the patient require bridging? no      Anticoagulation Summary  As of 2025      INR goal:  2.0-3.0   TTR:  74.0% (1.9 y)   INR used for dosin.10 (2025)   Weekly warfarin total:  50 mg               Assessment/Plan   Therapeutic     1. New dose: no change    2. Next INR: 1 month      Education provided to patient during the visit:  Patient instructed to call in interim with questions, concerns and changes.   Patient educated on interactions between medications and warfarin.   Patient educated on dietary consistency in vitamin k consumption.   Patient educated on affects of alcohol consumption while taking warfarin.   Patient educated on signs of bleeding/clotting.   Patient educated on compliance with dosing, follow up appointments, and prescribed plan of care.

## 2025-08-09 ENCOUNTER — APPOINTMENT (OUTPATIENT)
Dept: CARDIOLOGY | Facility: CLINIC | Age: 50
End: 2025-08-09
Payer: COMMERCIAL

## 2025-08-19 ENCOUNTER — TELEPHONE (OUTPATIENT)
Facility: CLINIC | Age: 50
End: 2025-08-19
Payer: COMMERCIAL

## 2025-09-06 ENCOUNTER — ANTICOAGULATION - WARFARIN VISIT (OUTPATIENT)
Dept: CARDIOLOGY | Facility: CLINIC | Age: 50
End: 2025-09-06
Payer: COMMERCIAL

## 2025-09-06 DIAGNOSIS — I82.4Y2 ACUTE VENOUS EMBOLISM AND THROMBOSIS OF DEEP VESSELS OF PROXIMAL END OF LEFT LOWER EXTREMITY: Primary | ICD-10-CM

## 2025-09-06 DIAGNOSIS — I82.492 DEEP VEIN THROMBOSIS (DVT) OF OTHER VEIN OF LEFT LOWER EXTREMITY, UNSPECIFIED CHRONICITY: ICD-10-CM

## 2025-09-06 LAB
POC INR: 1.8 (ref 0.9–1.1)
POC PROTHROMBIN TIME: ABNORMAL (ref 9.3–12.5)

## 2025-09-06 PROCEDURE — 85610 PROTHROMBIN TIME: CPT | Mod: QW | Performed by: INTERNAL MEDICINE

## 2025-09-06 PROCEDURE — 99211 OFF/OP EST MAY X REQ PHY/QHP: CPT

## 2025-09-12 ENCOUNTER — APPOINTMENT (OUTPATIENT)
Dept: RHEUMATOLOGY | Facility: CLINIC | Age: 50
End: 2025-09-12
Payer: COMMERCIAL

## 2025-10-28 ENCOUNTER — APPOINTMENT (OUTPATIENT)
Facility: CLINIC | Age: 50
End: 2025-10-28
Payer: COMMERCIAL

## 2026-08-14 ENCOUNTER — APPOINTMENT (OUTPATIENT)
Facility: CLINIC | Age: 51
End: 2026-08-14
Payer: COMMERCIAL